# Patient Record
Sex: FEMALE | Race: WHITE | NOT HISPANIC OR LATINO | Employment: UNEMPLOYED | URBAN - METROPOLITAN AREA
[De-identification: names, ages, dates, MRNs, and addresses within clinical notes are randomized per-mention and may not be internally consistent; named-entity substitution may affect disease eponyms.]

---

## 2018-08-13 ENCOUNTER — OFFICE VISIT (OUTPATIENT)
Dept: ORTHOPEDICS | Facility: CLINIC | Age: 23
End: 2018-08-13
Payer: MEDICAID

## 2018-08-13 VITALS — HEIGHT: 61 IN | WEIGHT: 129 LBS | BODY MASS INDEX: 24.35 KG/M2

## 2018-08-13 DIAGNOSIS — M65.4 DE QUERVAIN'S DISEASE (RADIAL STYLOID TENOSYNOVITIS): Primary | ICD-10-CM

## 2018-08-13 PROCEDURE — 99213 OFFICE O/P EST LOW 20 MIN: CPT | Mod: 25,,, | Performed by: ORTHOPAEDIC SURGERY

## 2018-08-13 PROCEDURE — 20550 NJX 1 TENDON SHEATH/LIGAMENT: CPT | Mod: RT,,, | Performed by: ORTHOPAEDIC SURGERY

## 2018-08-13 PROCEDURE — 73100 X-RAY EXAM OF WRIST: CPT | Mod: FY,RT,, | Performed by: ORTHOPAEDIC SURGERY

## 2018-08-13 RX ORDER — MIRTAZAPINE 30 MG/1
1 TABLET, FILM COATED ORAL DAILY
COMMUNITY
Start: 2018-08-03 | End: 2018-09-10 | Stop reason: DRUGHIGH

## 2018-08-13 RX ORDER — VENLAFAXINE HYDROCHLORIDE 225 MG/1
1 TABLET, EXTENDED RELEASE ORAL DAILY
Status: ON HOLD | COMMUNITY
Start: 2018-08-03 | End: 2019-08-06 | Stop reason: HOSPADM

## 2018-08-13 RX ORDER — VENLAFAXINE HYDROCHLORIDE 150 MG/1
1 CAPSULE, EXTENDED RELEASE ORAL NIGHTLY
COMMUNITY
Start: 2018-08-03 | End: 2019-07-31 | Stop reason: SDUPTHER

## 2018-08-13 RX ORDER — TESTOSTERONE CYPIONATE 1000 MG/10ML
100 INJECTION, SOLUTION INTRAMUSCULAR WEEKLY
COMMUNITY
Start: 2018-08-03

## 2018-08-13 RX ORDER — BUSPIRONE HYDROCHLORIDE 15 MG/1
1 TABLET ORAL DAILY
COMMUNITY
End: 2018-09-10 | Stop reason: SDUPTHER

## 2018-08-13 RX ADMIN — TRIAMCINOLONE ACETONIDE 40 MG: 40 INJECTION, SUSPENSION INTRA-ARTICULAR; INTRAMUSCULAR at 04:08

## 2018-08-13 NOTE — PROCEDURES
Intermediate Joint Aspiration/Injection  Date/Time: 8/13/2018 2:00 PM  Performed by: Pedro Smith MD  Authorized by: Pedro Smith MD     Indications:  Pain    Location:  Wrist  Medications:  20 mg triamcinolone hexacetonide 20 mg/mL

## 2018-08-13 NOTE — PROGRESS NOTES
Past Medical History:   Diagnosis Date    Anxiety 2015    Asthma     Bulging disc     Panic disorder 2016    Protrusion of lumbar intervertebral disc        History reviewed. No pertinent surgical history.    Current Outpatient Medications   Medication Sig    busPIRone (BUSPAR) 15 MG tablet Take 15 mg by mouth once daily.    busPIRone (BUSPAR) 15 MG tablet Take 1 tablet by mouth once daily.    methocarbamol (ROBAXIN) 750 MG Tab Take 750 mg by mouth as needed.    mirtazapine (REMERON) 30 MG tablet Take 1 tablet by mouth once daily.    naproxen (NAPROSYN) 500 MG tablet Take 500 mg by mouth as needed.    testosterone cypionate (DEPOTESTOTERONE CYPIONATE) 100 mg/mL injection     venlafaxine (EFFEXOR-XR) 150 MG Cp24 Take 1 capsule by mouth every evening.    venlafaxine 225 mg TR24 Take 1 tablet by mouth once daily.     No current facility-administered medications for this visit.        Review of patient's allergies indicates:  No Known Allergies    Family History   Problem Relation Age of Onset    Heart disease Paternal Grandmother        Social History     Socioeconomic History    Marital status: Single     Spouse name: Not on file    Number of children: Not on file    Years of education: Not on file    Highest education level: Not on file   Social Needs    Financial resource strain: Not on file    Food insecurity - worry: Not on file    Food insecurity - inability: Not on file    Transportation needs - medical: Not on file    Transportation needs - non-medical: Not on file   Occupational History    Not on file   Tobacco Use    Smoking status: Never Smoker    Smokeless tobacco: Never Used   Substance and Sexual Activity    Alcohol use: No     Frequency: Never    Drug use: No    Sexual activity: Not on file   Other Topics Concern    Not on file   Social History Narrative    Not on file       Chief Complaint:   Chief Complaint   Patient presents with    Right Wrist - Pain     Patient has  "had Right Wrist pain for about 4-5 months with no apparent injury. No xrays. No treatment. Texting with right hand hurts as well as holding anything with weight.        Consulting Physician: No ref. provider found    History of Present Illness:    This is a 22 y.o. year old female who complains of patient presents with about a four-month history of right wrist pain denies any history of any injury      ROS    Examination:    Vital Signs:    Vitals:    08/13/18 1334   Weight: 58.5 kg (129 lb)   Height: 5' 1" (1.549 m)   PainSc:   5   PainLoc: Wrist       This a well-developed, well nourished patient in no acute distress.    Alert and oriented and cooperative to examination.       Physical Exam: right wrist-there is swelling over the first dorsal compartment of the wrist the patient has a positive Finkelstein test patient is good range of motion of the wrist and fingers    Imaging:  AP and lateral x-ray of the right wrist was negative     Assessment: de Quervain's tenosynovitis right wrist    Plan:  Will inject the area with Kenalogthe procedure for the injection of the de Quervain's tenosynovitis of the right wrist was performed on 08/13/2018      DISCLAIMER: This note may have been dictated using voice recognition software and may contain grammatical errors.     NOTE: Consult report sent to referring provider via [facility] EMR.  "

## 2018-08-22 RX ORDER — TRIAMCINOLONE ACETONIDE 40 MG/ML
40 INJECTION, SUSPENSION INTRA-ARTICULAR; INTRAMUSCULAR
Status: SHIPPED | OUTPATIENT
Start: 2018-08-13 | End: 2018-08-13

## 2018-08-22 NOTE — PROCEDURES
R first doral compartmentTendon Sheath  Date/Time: 8/13/2018 4:26 PM  Performed by: Pedro Smith MD  Authorized by: Pedro Smith MD     Indications: Pain  Location:  Wrist  Needle size:  22 G  Medications:  40 mg triamcinolone acetonide 40 mg/mL

## 2018-09-10 ENCOUNTER — OFFICE VISIT (OUTPATIENT)
Dept: ORTHOPEDICS | Facility: CLINIC | Age: 23
End: 2018-09-10
Payer: MEDICAID

## 2018-09-10 VITALS — BODY MASS INDEX: 24.35 KG/M2 | HEIGHT: 61 IN | WEIGHT: 129 LBS

## 2018-09-10 DIAGNOSIS — M65.4 DE QUERVAIN'S DISEASE (RADIAL STYLOID TENOSYNOVITIS): Primary | ICD-10-CM

## 2018-09-10 PROCEDURE — 99213 OFFICE O/P EST LOW 20 MIN: CPT | Mod: ,,, | Performed by: ORTHOPAEDIC SURGERY

## 2018-09-10 RX ORDER — MIRTAZAPINE 45 MG/1
1 TABLET, FILM COATED ORAL DAILY
Status: ON HOLD | COMMUNITY
Start: 2018-08-13 | End: 2019-08-06 | Stop reason: HOSPADM

## 2018-09-10 NOTE — PROGRESS NOTES
Past Medical History:   Diagnosis Date    Anxiety 2015    Asthma     Bulging disc     Panic disorder 2016    Protrusion of lumbar intervertebral disc        History reviewed. No pertinent surgical history.    Current Outpatient Medications   Medication Sig    mirtazapine (REMERON) 45 MG tablet Take 1 tablet by mouth once daily.    naproxen (NAPROSYN) 500 MG tablet Take 500 mg by mouth as needed.    testosterone cypionate (DEPOTESTOTERONE CYPIONATE) 100 mg/mL injection Inject 100 mg into the muscle once a week.     venlafaxine (EFFEXOR-XR) 150 MG Cp24 Take 1 capsule by mouth every evening.    venlafaxine 225 mg TR24 Take 1 tablet by mouth once daily.     No current facility-administered medications for this visit.      Facility-Administered Medications Ordered in Other Visits   Medication    [] triamcinolone acetonide injection 40 mg       Review of patient's allergies indicates:  No Known Allergies    Family History   Problem Relation Age of Onset    Heart disease Paternal Grandmother        Social History     Socioeconomic History    Marital status: Single     Spouse name: Not on file    Number of children: Not on file    Years of education: Not on file    Highest education level: Not on file   Social Needs    Financial resource strain: Not on file    Food insecurity - worry: Not on file    Food insecurity - inability: Not on file    Transportation needs - medical: Not on file    Transportation needs - non-medical: Not on file   Occupational History    Not on file   Tobacco Use    Smoking status: Never Smoker    Smokeless tobacco: Never Used   Substance and Sexual Activity    Alcohol use: No     Frequency: Never    Drug use: No    Sexual activity: Not on file   Other Topics Concern    Not on file   Social History Narrative    Not on file       Chief Complaint:   Chief Complaint   Patient presents with    Follow-up     Chronic right wrist pain since approx. 3/2018 w/ no known  "injury.  Patient received a steroid injection on 8/13/18 visit which helped.  Problems off & on with discomfort.  Reports a popping in the wrist when moving it at times.       Consulting Physician: No ref. provider found    History of Present Illness:    This is a 22 y.o. year old female who complains of patient is following up today for history of chronic wrist pain patient was injected last time for de Quervain's T no synovitis of the right thumb patient had a good response with the injection the patient complains of some intermittent popping pain in the wrist but overall is doing much better      ROS    Examination:    Vital Signs:    Vitals:    09/10/18 0826   Weight: 58.5 kg (129 lb)   Height: 5' 1" (1.549 m)   PainSc:   2   PainLoc: Wrist       This a well-developed, well nourished patient in no acute distress.    Alert and oriented and cooperative to examination.       Physical Exam: right wrist-patient has a negative Finkelstein test today with only some mild discomfort over the radial styloid area    Imaging:  No x-rays done today     Assessment: jean Hatfield's see no synovitis of the right wrist doing well with the injection    Plan:  At this time the patient is notready to do any surgery on the wrist at this time will observe if thepain increases I would recommend jean Hatfield's release      DISCLAIMER: This note may have been dictated using voice recognition software and may contain grammatical errors.     NOTE: Consult report sent to referring provider via EP[facility] EMR.  "

## 2019-04-03 ENCOUNTER — OFFICE VISIT (OUTPATIENT)
Dept: ORTHOPEDICS | Facility: CLINIC | Age: 24
End: 2019-04-03
Payer: MEDICAID

## 2019-04-03 VITALS
BODY MASS INDEX: 24.35 KG/M2 | SYSTOLIC BLOOD PRESSURE: 110 MMHG | HEIGHT: 61 IN | DIASTOLIC BLOOD PRESSURE: 84 MMHG | WEIGHT: 129 LBS

## 2019-04-03 DIAGNOSIS — M65.4 DE QUERVAIN'S DISEASE (RADIAL STYLOID TENOSYNOVITIS): Primary | ICD-10-CM

## 2019-04-03 PROCEDURE — 73100 X-RAY EXAM OF WRIST: CPT | Mod: FY,RT,, | Performed by: ORTHOPAEDIC SURGERY

## 2019-04-03 PROCEDURE — 73100 PR  X-RAY WRIST 2 VW: ICD-10-PCS | Mod: FY,RT,, | Performed by: ORTHOPAEDIC SURGERY

## 2019-04-03 PROCEDURE — 99213 PR OFFICE/OUTPT VISIT, EST, LEVL III, 20-29 MIN: ICD-10-PCS | Mod: ,,, | Performed by: ORTHOPAEDIC SURGERY

## 2019-04-03 PROCEDURE — 99213 OFFICE O/P EST LOW 20 MIN: CPT | Mod: ,,, | Performed by: ORTHOPAEDIC SURGERY

## 2019-04-03 RX ORDER — HYDROXYZINE HYDROCHLORIDE 25 MG/1
TABLET, FILM COATED ORAL
Status: ON HOLD | COMMUNITY
Start: 2019-03-09 | End: 2019-08-06 | Stop reason: HOSPADM
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
## 2019-04-05 LAB
ALBUMIN SERPL-MCNC: 4.7 G/DL (ref 3.1–4.7)
ALP SERPL-CCNC: 61 IU/L (ref 40–104)
ALT (SGPT): 20 IU/L (ref 3–33)
AST SERPL-CCNC: 20 IU/L (ref 10–40)
BILIRUB SERPL-MCNC: 0.9 MG/DL (ref 0.3–1)
BUN SERPL-MCNC: 14 MG/DL (ref 8–20)
CALCIUM SERPL-MCNC: 9.8 MG/DL (ref 7.7–10.4)
CHLORIDE: 101 MMOL/L (ref 98–110)
CO2 SERPL-SCNC: 29 MMOL/L (ref 22.8–31.6)
CREATININE: 0.69 MG/DL (ref 0.6–1.4)
GLUCOSE: 77 MG/DL (ref 70–99)
HCT VFR BLD AUTO: 40.5 % (ref 36–48)
HGB BLD-MCNC: 13.2 G/DL (ref 12–15)
MCH RBC QN AUTO: 29.9 PG (ref 25–35)
MCHC RBC AUTO-ENTMCNC: 32.6 G/DL (ref 31–36)
MCV RBC AUTO: 91.6 FL (ref 79–98)
NUCLEATED RBCS: 0 %
PLATELET # BLD AUTO: 295 K/UL (ref 140–440)
POTASSIUM SERPL-SCNC: 4 MMOL/L (ref 3.5–5)
PROT SERPL-MCNC: 8.1 G/DL (ref 6–8.2)
RBC # BLD AUTO: 4.42 M/UL (ref 3.5–5.5)
SODIUM: 139 MMOL/L (ref 134–144)
WBC # BLD AUTO: 4.9 K/UL (ref 5–10)

## 2019-06-27 DIAGNOSIS — M65.4 DE QUERVAIN'S DISEASE (RADIAL STYLOID TENOSYNOVITIS): Primary | ICD-10-CM

## 2019-07-05 LAB
ALBUMIN SERPL-MCNC: 4.7 G/DL (ref 3.1–4.7)
ALP SERPL-CCNC: 55 IU/L (ref 40–104)
ALT (SGPT): 17 IU/L (ref 3–33)
AST SERPL-CCNC: 22 IU/L (ref 10–40)
BILIRUB SERPL-MCNC: 1.2 MG/DL (ref 0.3–1)
BUN SERPL-MCNC: 12 MG/DL (ref 8–20)
CALCIUM SERPL-MCNC: 9.4 MG/DL (ref 7.7–10.4)
CHLORIDE: 102 MMOL/L (ref 98–110)
CO2 SERPL-SCNC: 27.7 MMOL/L (ref 22.8–31.6)
CREATININE: 0.78 MG/DL (ref 0.6–1.4)
GLUCOSE: 83 MG/DL (ref 70–99)
HCT VFR BLD AUTO: 42.1 % (ref 36–48)
HGB BLD-MCNC: 14.1 G/DL (ref 12–15)
MCH RBC QN AUTO: 30.1 PG (ref 25–35)
MCHC RBC AUTO-ENTMCNC: 33.5 G/DL (ref 31–36)
MCV RBC AUTO: 90 FL (ref 79–98)
NUCLEATED RBCS: 0 %
PLATELET # BLD AUTO: 285 K/UL (ref 140–440)
POTASSIUM SERPL-SCNC: 4.1 MMOL/L (ref 3.5–5)
PROT SERPL-MCNC: 8 G/DL (ref 6–8.2)
RBC # BLD AUTO: 4.68 M/UL (ref 3.5–5.5)
SODIUM: 138 MMOL/L (ref 134–144)
WBC # BLD AUTO: 4.6 K/UL (ref 5–10)

## 2019-07-17 ENCOUNTER — OFFICE VISIT (OUTPATIENT)
Dept: ORTHOPEDICS | Facility: CLINIC | Age: 24
End: 2019-07-17
Payer: MEDICAID

## 2019-07-17 VITALS — TEMPERATURE: 98 F | HEIGHT: 61 IN | HEART RATE: 113 BPM | WEIGHT: 129 LBS | BODY MASS INDEX: 24.35 KG/M2

## 2019-07-17 DIAGNOSIS — M65.4 DE QUERVAIN'S DISEASE (RADIAL STYLOID TENOSYNOVITIS): Primary | ICD-10-CM

## 2019-07-17 PROCEDURE — 99024 PR POST-OP FOLLOW-UP VISIT: ICD-10-PCS | Mod: ,,, | Performed by: ORTHOPAEDIC SURGERY

## 2019-07-17 PROCEDURE — 99024 POSTOP FOLLOW-UP VISIT: CPT | Mod: ,,, | Performed by: ORTHOPAEDIC SURGERY

## 2019-07-17 RX ORDER — SULFAMETHOXAZOLE AND TRIMETHOPRIM 800; 160 MG/1; MG/1
TABLET ORAL
Refills: 0 | COMMUNITY
Start: 2019-07-11 | End: 2019-07-31 | Stop reason: ALTCHOICE

## 2019-07-17 RX ORDER — OXYCODONE AND ACETAMINOPHEN 10; 325 MG/1; MG/1
TABLET ORAL
Refills: 0 | Status: ON HOLD | COMMUNITY
Start: 2019-07-11 | End: 2019-08-06 | Stop reason: HOSPADM

## 2019-07-17 NOTE — PROGRESS NOTES
Past Medical History:   Diagnosis Date    Anxiety 2015    Asthma     Bulging disc     Panic disorder 2016    Protrusion of lumbar intervertebral disc        Past Surgical History:   Procedure Laterality Date    DE QUERVAIN'S RELEASE Right 2019       Current Outpatient Medications   Medication Sig    mirtazapine (REMERON) 45 MG tablet Take 1 tablet by mouth once daily.    oxyCODONE-acetaminophen (PERCOCET)  mg per tablet TK 1 T PO Q 4 TO 6 H PRN    sulfamethoxazole-trimethoprim 800-160mg (BACTRIM DS) 800-160 mg Tab TK 1 T PO  BID    testosterone cypionate (DEPOTESTOTERONE CYPIONATE) 100 mg/mL injection Inject 100 mg into the muscle once a week.     venlafaxine (EFFEXOR-XR) 150 MG Cp24 Take 1 capsule by mouth every evening.    venlafaxine 225 mg TR24 Take 1 tablet by mouth once daily.    hydrOXYzine HCl (ATARAX) 25 MG tablet      No current facility-administered medications for this visit.      Facility-Administered Medications Ordered in Other Visits   Medication    [] triamcinolone acetonide injection 40 mg       Review of patient's allergies indicates:  No Known Allergies    Family History   Problem Relation Age of Onset    Heart disease Paternal Grandmother        Social History     Socioeconomic History    Marital status: Single     Spouse name: Not on file    Number of children: Not on file    Years of education: Not on file    Highest education level: Not on file   Occupational History    Not on file   Social Needs    Financial resource strain: Not on file    Food insecurity:     Worry: Not on file     Inability: Not on file    Transportation needs:     Medical: Not on file     Non-medical: Not on file   Tobacco Use    Smoking status: Never Smoker    Smokeless tobacco: Never Used   Substance and Sexual Activity    Alcohol use: No     Frequency: Never    Drug use: No    Sexual activity: Not on file   Lifestyle    Physical activity:     Days per week: Not on file     " Minutes per session: Not on file    Stress: Not on file   Relationships    Social connections:     Talks on phone: Not on file     Gets together: Not on file     Attends Shinto service: Not on file     Active member of club or organization: Not on file     Attends meetings of clubs or organizations: Not on file     Relationship status: Not on file   Other Topics Concern    Not on file   Social History Narrative    Not on file       Chief Complaint:   Chief Complaint   Patient presents with    Right Wrist - Post-op Evaluation, Pain     DOS: 07/11/2019 6 Days S/P De'Quervains Release. Patient is in Splint and has PL 3/10. He states he has a little sore throat, sweats, and tired. He thinks pain meds are to strong, I advised to cut pain meds in 1/2 & take 1 tylenol with it.        Consulting Physician: No ref. provider found    History of Present Illness:    This is a 23 y.o. year old female who complains of patient is 6 days status post de Quervain's release of the right wrist      ROS    Examination:    Vital Signs:    Vitals:    07/17/19 1333   Pulse: (!) 113   Temp: 98.4 °F (36.9 °C)   TempSrc: Oral   Weight: 58.5 kg (129 lb)   Height: 5' 1" (1.549 m)   PainSc:   3       This a well-developed, well nourished patient in no acute distress.    Alert and oriented and cooperative to examination.       Physical Exam: right wrist-    Imaging:  No x-rays     Assessment: status post de Quervain's release right wrist    Plan:  We'll remove her splint and have her start range of motion exercises patient should leave the Steri-Strips in place for at least a week and will see patient back in 2      DISCLAIMER: This note may have been dictated using voice recognition software and may contain grammatical errors.     NOTE: Consult report sent to referring provider via EP[facility] EMR.  "

## 2019-07-19 ENCOUNTER — TELEPHONE (OUTPATIENT)
Dept: ORTHOPEDICS | Facility: CLINIC | Age: 24
End: 2019-07-19

## 2019-07-19 NOTE — TELEPHONE ENCOUNTER
----- Message from Pat Jackson MA sent at 7/18/2019 10:15 AM CDT -----  Contact: Fazal Kamara   Needs to schedule Post op,   Last week of July  Call back

## 2019-07-24 ENCOUNTER — TELEPHONE (OUTPATIENT)
Dept: ORTHOPEDICS | Facility: CLINIC | Age: 24
End: 2019-07-24

## 2019-07-24 NOTE — TELEPHONE ENCOUNTER
----- Message from Steve Chandler sent at 7/24/2019  8:47 AM CDT -----  Contact: pt father Fazal  Type: Needs Medical Advice    Who Called:  Fazal    [facility] Call Back Number: [phone]  Additional Information: Fazal has questions about care after surgery and if he needs to wait till the next appt

## 2019-07-24 NOTE — TELEPHONE ENCOUNTER
Patient's dad came into the office asking if bandage could be removed.  According to last visit, Dr. Smith told patient that Steri Strips could be removed with in the next week from appointment.  Informed father of this information and reminded him of return appointment next Wednesday 7/31/19 with Dr. Smith at new location.

## 2019-07-31 ENCOUNTER — OFFICE VISIT (OUTPATIENT)
Dept: ORTHOPEDICS | Facility: CLINIC | Age: 24
End: 2019-07-31
Payer: MEDICAID

## 2019-07-31 ENCOUNTER — HOSPITAL ENCOUNTER (EMERGENCY)
Facility: HOSPITAL | Age: 24
Discharge: PSYCHIATRIC OR SUBSTANCE USE FACILITY | End: 2019-08-01
Attending: EMERGENCY MEDICINE
Payer: MEDICAID

## 2019-07-31 VITALS
HEART RATE: 101 BPM | HEIGHT: 61 IN | BODY MASS INDEX: 24.35 KG/M2 | SYSTOLIC BLOOD PRESSURE: 113 MMHG | WEIGHT: 129 LBS | DIASTOLIC BLOOD PRESSURE: 72 MMHG

## 2019-07-31 DIAGNOSIS — F33.2 MDD (MAJOR DEPRESSIVE DISORDER), RECURRENT SEVERE, WITHOUT PSYCHOSIS: ICD-10-CM

## 2019-07-31 DIAGNOSIS — R45.851 SUICIDE IDEATION: Primary | ICD-10-CM

## 2019-07-31 DIAGNOSIS — F12.10 CANNABIS ABUSE: ICD-10-CM

## 2019-07-31 DIAGNOSIS — F33.2 SEVERE EPISODE OF RECURRENT MAJOR DEPRESSIVE DISORDER, WITHOUT PSYCHOTIC FEATURES: ICD-10-CM

## 2019-07-31 DIAGNOSIS — F41.1 GAD (GENERALIZED ANXIETY DISORDER): ICD-10-CM

## 2019-07-31 DIAGNOSIS — M65.4 DE QUERVAIN'S DISEASE (RADIAL STYLOID TENOSYNOVITIS): Primary | ICD-10-CM

## 2019-07-31 LAB
B-HCG UR QL: NEGATIVE
BASOPHILS # BLD AUTO: 0.02 K/UL (ref 0–0.2)
BASOPHILS NFR BLD: 0.2 % (ref 0–1.9)
CTP QC/QA: YES
DIFFERENTIAL METHOD: NORMAL
EOSINOPHIL # BLD AUTO: 0.1 K/UL (ref 0–0.5)
EOSINOPHIL NFR BLD: 0.6 % (ref 0–8)
ERYTHROCYTE [DISTWIDTH] IN BLOOD BY AUTOMATED COUNT: 13.2 % (ref 11.5–14.5)
HCT VFR BLD AUTO: 41.6 % (ref 37–48.5)
HGB BLD-MCNC: 14.1 G/DL (ref 12–16)
IMM GRANULOCYTES # BLD AUTO: 0.02 K/UL (ref 0–0.04)
IMM GRANULOCYTES NFR BLD AUTO: 0.2 % (ref 0–0.5)
LYMPHOCYTES # BLD AUTO: 3.4 K/UL (ref 1–4.8)
LYMPHOCYTES NFR BLD: 35.9 % (ref 18–48)
MCH RBC QN AUTO: 30.2 PG (ref 27–31)
MCHC RBC AUTO-ENTMCNC: 33.9 G/DL (ref 32–36)
MCV RBC AUTO: 89 FL (ref 82–98)
MONOCYTES # BLD AUTO: 0.7 K/UL (ref 0.3–1)
MONOCYTES NFR BLD: 7.7 % (ref 4–15)
NEUTROPHILS # BLD AUTO: 5.2 K/UL (ref 1.8–7.7)
NEUTROPHILS NFR BLD: 55.4 % (ref 38–73)
NRBC BLD-RTO: 0 /100 WBC
PLATELET # BLD AUTO: 319 K/UL (ref 150–350)
PMV BLD AUTO: 9.3 FL (ref 9.2–12.9)
RBC # BLD AUTO: 4.67 M/UL (ref 4–5.4)
WBC # BLD AUTO: 9.38 K/UL (ref 3.9–12.7)

## 2019-07-31 PROCEDURE — 99284 EMERGENCY DEPT VISIT MOD MDM: CPT

## 2019-07-31 PROCEDURE — 80307 DRUG TEST PRSMV CHEM ANLYZR: CPT

## 2019-07-31 PROCEDURE — 99282 EMERGENCY DEPT VISIT SF MDM: CPT | Mod: GT,AF,HB,S$PBB | Performed by: PSYCHIATRY & NEUROLOGY

## 2019-07-31 PROCEDURE — 99024 POSTOP FOLLOW-UP VISIT: CPT | Mod: ,,, | Performed by: ORTHOPAEDIC SURGERY

## 2019-07-31 PROCEDURE — 99999 PR PBB SHADOW E&M-NEW PATIENT-LVL III: CPT | Mod: PBBFAC,,, | Performed by: ORTHOPAEDIC SURGERY

## 2019-07-31 PROCEDURE — 99285 EMERGENCY DEPT VISIT HI MDM: CPT

## 2019-07-31 PROCEDURE — 85025 COMPLETE CBC W/AUTO DIFF WBC: CPT

## 2019-07-31 PROCEDURE — 99999 PR PBB SHADOW E&M-NEW PATIENT-LVL III: ICD-10-PCS | Mod: PBBFAC,,, | Performed by: ORTHOPAEDIC SURGERY

## 2019-07-31 PROCEDURE — 99282 PR EMERGENCY DEPT VISIT,LEVEL II: ICD-10-PCS | Mod: GT,AF,HB,S$PBB | Performed by: PSYCHIATRY & NEUROLOGY

## 2019-07-31 PROCEDURE — 80053 COMPREHEN METABOLIC PANEL: CPT

## 2019-07-31 PROCEDURE — 81025 URINE PREGNANCY TEST: CPT | Performed by: EMERGENCY MEDICINE

## 2019-07-31 PROCEDURE — 99024 PR POST-OP FOLLOW-UP VISIT: ICD-10-PCS | Mod: ,,, | Performed by: ORTHOPAEDIC SURGERY

## 2019-07-31 PROCEDURE — 99203 OFFICE O/P NEW LOW 30 MIN: CPT | Mod: PBBFAC,PN | Performed by: ORTHOPAEDIC SURGERY

## 2019-07-31 PROCEDURE — 80320 DRUG SCREEN QUANTALCOHOLS: CPT

## 2019-07-31 NOTE — PROGRESS NOTES
Past Medical History:   Diagnosis Date    Anxiety 2015    Asthma     Bulging disc     Panic disorder 2016    Protrusion of lumbar intervertebral disc        Past Surgical History:   Procedure Laterality Date    DE QUERVAIN'S RELEASE Right 2019       Current Outpatient Medications   Medication Sig    hydrOXYzine HCl (ATARAX) 25 MG tablet     mirtazapine (REMERON) 45 MG tablet Take 1 tablet by mouth once daily.    oxyCODONE-acetaminophen (PERCOCET)  mg per tablet TK 1 T PO Q 4 TO 6 H PRN    sulfamethoxazole-trimethoprim 800-160mg (BACTRIM DS) 800-160 mg Tab TK 1 T PO  BID    testosterone cypionate (DEPOTESTOTERONE CYPIONATE) 100 mg/mL injection Inject 100 mg into the muscle once a week.     venlafaxine (EFFEXOR-XR) 150 MG Cp24 Take 1 capsule by mouth every evening.    venlafaxine 225 mg TR24 Take 1 tablet by mouth once daily.     No current facility-administered medications for this visit.      Facility-Administered Medications Ordered in Other Visits   Medication    [] triamcinolone acetonide injection 40 mg       Review of patient's allergies indicates:  No Known Allergies    Family History   Problem Relation Age of Onset    Heart disease Paternal Grandmother        Social History     Socioeconomic History    Marital status: Single     Spouse name: Not on file    Number of children: Not on file    Years of education: Not on file    Highest education level: Not on file   Occupational History    Not on file   Social Needs    Financial resource strain: Not on file    Food insecurity:     Worry: Not on file     Inability: Not on file    Transportation needs:     Medical: Not on file     Non-medical: Not on file   Tobacco Use    Smoking status: Never Smoker    Smokeless tobacco: Never Used   Substance and Sexual Activity    Alcohol use: No     Frequency: Never    Drug use: No    Sexual activity: Not on file   Lifestyle    Physical activity:     Days per week: Not on file     " Minutes per session: Not on file    Stress: Not on file   Relationships    Social connections:     Talks on phone: Not on file     Gets together: Not on file     Attends Uatsdin service: Not on file     Active member of club or organization: Not on file     Attends meetings of clubs or organizations: Not on file     Relationship status: Not on file   Other Topics Concern    Not on file   Social History Narrative    Not on file       Chief Complaint:   Chief Complaint   Patient presents with    Right Wrist - Post-op Evaluation     DOS: 07/11/2019  20 days S/P De'Quervains Release. Patient states has no pain at this time. However there is pain with certain movements.        Consulting Physician: No ref. provider found    History of Present Illness:    This is a 23 y.o. year old female who complains of patient is now 20 days status post coronary is released to the right wrist patient denies any pain at this time may have some discomfort with certain movements      ROS    Examination:    Vital Signs:    Vitals:    07/31/19 1013   BP: 113/72   Pulse: 101   Weight: 58.5 kg (129 lb)   Height: 5' 1" (1.549 m)   PainSc: 0-No pain   PainLoc: Wrist       This a well-developed, well nourished patient in no acute distress.    Alert and oriented and cooperative to examination.       Physical Exam:  Right wrist-incision is healing well moves the thumb well as a negative Finkelstein test and good range of motion    Imaging:  No x-rays     Assessment:  Status post right de Quervain release doing well    Plan:  Patient can increase activities as tolerated and return as needed      DISCLAIMER: This note may have been dictated using voice recognition software and may contain grammatical errors.     NOTE: Consult report sent to referring provider via EPIC EMR.  "

## 2019-08-01 VITALS
HEIGHT: 62 IN | RESPIRATION RATE: 19 BRPM | BODY MASS INDEX: 21.16 KG/M2 | SYSTOLIC BLOOD PRESSURE: 118 MMHG | HEART RATE: 113 BPM | DIASTOLIC BLOOD PRESSURE: 68 MMHG | TEMPERATURE: 99 F | WEIGHT: 115 LBS | OXYGEN SATURATION: 98 %

## 2019-08-01 PROBLEM — R45.851 SUICIDAL IDEATION: Status: ACTIVE | Noted: 2019-08-01

## 2019-08-01 LAB
ALBUMIN SERPL BCP-MCNC: 5.1 G/DL (ref 3.5–5.2)
ALP SERPL-CCNC: 56 U/L (ref 55–135)
ALT SERPL W/O P-5'-P-CCNC: 19 U/L (ref 10–44)
AMPHET+METHAMPHET UR QL: NEGATIVE
ANION GAP SERPL CALC-SCNC: 8 MMOL/L (ref 8–16)
APAP SERPL-MCNC: <10 UG/ML (ref 10–20)
AST SERPL-CCNC: 20 U/L (ref 10–40)
BACTERIA #/AREA URNS HPF: NEGATIVE /HPF
BARBITURATES UR QL SCN>200 NG/ML: NORMAL
BENZODIAZ UR QL SCN>200 NG/ML: NEGATIVE
BILIRUB SERPL-MCNC: 1 MG/DL (ref 0.1–1)
BILIRUB UR QL STRIP: ABNORMAL
BUN SERPL-MCNC: 20 MG/DL (ref 6–20)
BZE UR QL SCN: NEGATIVE
CALCIUM SERPL-MCNC: 10 MG/DL (ref 8.7–10.5)
CANNABINOIDS UR QL SCN: NORMAL
CHLORIDE SERPL-SCNC: 103 MMOL/L (ref 95–110)
CLARITY UR: CLEAR
CO2 SERPL-SCNC: 28 MMOL/L (ref 23–29)
COLOR UR: YELLOW
CREAT SERPL-MCNC: 0.7 MG/DL (ref 0.5–1.4)
CREAT UR-MCNC: 206 MG/DL (ref 15–325)
EST. GFR  (AFRICAN AMERICAN): >60 ML/MIN/1.73 M^2
EST. GFR  (NON AFRICAN AMERICAN): >60 ML/MIN/1.73 M^2
ETHANOL SERPL-MCNC: <5 MG/DL
GLUCOSE SERPL-MCNC: 90 MG/DL (ref 70–110)
GLUCOSE UR QL STRIP: NEGATIVE
HGB UR QL STRIP: ABNORMAL
HYALINE CASTS #/AREA URNS LPF: 17 /LPF
KETONES UR QL STRIP: ABNORMAL
LEUKOCYTE ESTERASE UR QL STRIP: ABNORMAL
MICROSCOPIC COMMENT: ABNORMAL
NITRITE UR QL STRIP: NEGATIVE
OPIATES UR QL SCN: NEGATIVE
PCP UR QL SCN>25 NG/ML: NEGATIVE
PH UR STRIP: 6 [PH] (ref 5–8)
POTASSIUM SERPL-SCNC: 3.7 MMOL/L (ref 3.5–5.1)
PROT SERPL-MCNC: 8.1 G/DL (ref 6–8.4)
PROT UR QL STRIP: ABNORMAL
RBC #/AREA URNS HPF: 4 /HPF (ref 0–4)
SODIUM SERPL-SCNC: 139 MMOL/L (ref 136–145)
SP GR UR STRIP: >=1.03 (ref 1–1.03)
SQUAMOUS #/AREA URNS HPF: 2 /HPF
TOXICOLOGY INFORMATION: NORMAL
URN SPEC COLLECT METH UR: ABNORMAL
UROBILINOGEN UR STRIP-ACNC: NEGATIVE EU/DL
WBC #/AREA URNS HPF: >100 /HPF (ref 0–5)

## 2019-08-01 PROCEDURE — 81001 URINALYSIS AUTO W/SCOPE: CPT | Mod: 59

## 2019-08-01 PROCEDURE — 80307 DRUG TEST PRSMV CHEM ANLYZR: CPT
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
## 2019-08-01 NOTE — ED NOTES
Pt to psych facility with SPD. Security and sitter escorted out. Items returned. Pt ambulatory at d/c. Wheeled out by staff. Purple gown removed. Paper scrubs applied.

## 2019-08-01 NOTE — ED NOTES
ED provider notified of conversation with transfer center.  Transfer center paged per MD request.

## 2019-08-01 NOTE — ED PROVIDER NOTES
Encounter Date: 7/31/2019       History     Chief Complaint   Patient presents with    Suicidal     INTERMITTENT, WITH POSSIBLE THOUGHTS BUT NO ACTUAL PLAN     24-year-old female with history of social anxiety disorder generalized anxiety depression major depressive episode previous suicide attempt in the past patient referred to the emergency department after being interviewed by her psychologist and she reported desires of self-harm and suicidal ideation.  Patient states that she has been having suicidal thoughts over the last 2-3 days which has been increasing and fixated on these emotions.  She denies homicidal ideation no recent illness or constitutional symptoms. Patient does agree that she feels that she needs inpatient evaluation and treatment at this time.        Review of patient's allergies indicates:  No Known Allergies  Past Medical History:   Diagnosis Date    Anxiety 2015    Asthma     Bulging disc     Panic disorder 2016    Protrusion of lumbar intervertebral disc      Past Surgical History:   Procedure Laterality Date    DE QUERVAIN'S RELEASE Right 07/11/2019     Family History   Problem Relation Age of Onset    Heart disease Paternal Grandmother      Social History     Tobacco Use    Smoking status: Never Smoker    Smokeless tobacco: Never Used   Substance Use Topics    Alcohol use: No     Frequency: Never    Drug use: No     Review of Systems   Constitutional: Negative for chills, fatigue and fever.   HENT: Negative for congestion, postnasal drip, rhinorrhea, sinus pain and trouble swallowing.    Eyes: Negative for photophobia and visual disturbance.   Respiratory: Negative for chest tightness, shortness of breath and wheezing.    Cardiovascular: Negative for chest pain, palpitations and leg swelling.   Gastrointestinal: Negative for abdominal pain, blood in stool, nausea and vomiting.   Endocrine: Negative for polydipsia, polyphagia and polyuria.   Genitourinary: Negative for dysuria,  flank pain, urgency, vaginal bleeding and vaginal discharge.   Musculoskeletal: Negative for back pain and gait problem.   Skin: Negative for rash.   Neurological: Negative for tremors, weakness and numbness.   Hematological: Does not bruise/bleed easily.   Psychiatric/Behavioral: Positive for decreased concentration, self-injury and suicidal ideas. Negative for confusion. The patient is nervous/anxious.    All other systems reviewed and are negative.      Physical Exam     Initial Vitals [07/31/19 2152]   BP Pulse Resp Temp SpO2   113/75 (!) 120 20 98.4 °F (36.9 °C) 98 %      MAP       --         Physical Exam    Nursing note and vitals reviewed.  Constitutional: She appears well-developed and well-nourished.   HENT:   Head: Normocephalic and atraumatic.   Nose: Nose normal.   Mouth/Throat: Oropharynx is clear and moist.   Eyes: Conjunctivae and EOM are normal. Pupils are equal, round, and reactive to light.   Neck: Normal range of motion. Neck supple. No thyromegaly present. No tracheal deviation present.   Cardiovascular: Normal rate, regular rhythm, normal heart sounds and intact distal pulses. Exam reveals no gallop and no friction rub.    No murmur heard.  Pulmonary/Chest: Breath sounds normal. No respiratory distress.   Abdominal: Soft. Bowel sounds are normal. She exhibits no mass. There is no rebound and no guarding.   Musculoskeletal: Normal range of motion. She exhibits no edema.   Lymphadenopathy:     She has no cervical adenopathy.   Neurological: She is alert and oriented to person, place, and time. She has normal strength and normal reflexes. GCS score is 15. GCS eye subscore is 4. GCS verbal subscore is 5. GCS motor subscore is 6.   Skin: Skin is warm and dry. Capillary refill takes less than 2 seconds.   Psychiatric: Her speech is normal. Her mood appears anxious. She is slowed. She is not hyperactive, not actively hallucinating and not combative. Cognition and memory are normal. She exhibits a  depressed mood. She expresses suicidal ideation. She expresses suicidal plans.   Patient with previous history of overdose.         ED Course   Procedures  Labs Reviewed   COMPREHENSIVE METABOLIC PANEL   CBC W/ AUTO DIFFERENTIAL   DRUG SCREEN PANEL, URINE EMERGENCY    Narrative:     Preferred Collection Type->Urine, Clean Catch  Specimen Source->Urine   ALCOHOL,MEDICAL (ETHANOL)   ACETAMINOPHEN LEVEL   URINALYSIS, REFLEX TO URINE CULTURE   POCT URINE PREGNANCY          Imaging Results    None          Medical Decision Making:   Initial Assessment:    23-year-old female with history of depression, anxiety, major depressive disorder currently currently compliant with her medications patient however with increased thoughts of Suicidal ideation over the last 24-48 hours presents to the emergency department for inpatient psychiatric evaluation and medical clearance.  Differential Diagnosis:   Differential Diagnosis includes, but is not limited to:  Decompensated psychiatric disease (schizophrenia, bipolar disorder, major depression), excited delirium, medication noncompliance, substance abuse/withdrawal, intentional drug overdose, medication toxicity, APAP/ASA overdose, acute stress reaction, personality disorder, malingering, metabolic derangement    Clinical Tests:   Lab Tests: Ordered and Reviewed  ED Management:  7:59 AM The patient is medically cleared for psychiatric hospitalization at this time.  I have reviewed all testing done in the Emergency Department, no acute medical issue is present that would account for the patient's symptoms.                       ED Course as of Aug 01 0743   Thu Aug 01, 2019   0003 I discussed the case with Dr. Condon who agreed with inpatient psychiatric placement.    [AP]   0345 Patient is medically clear for psychiatric evaluation    [AP]   0346 BP: 113/75 [AP]   0347 Temp: 98.4 °F (36.9 °C) [AP]   0347 Pulse(!): 120 [AP]   0347 Resp: 20 [AP]   0347 SpO2: 98 % [AP]   0503 Pulse: 84  [AP]   0504 Pulse: 84 [AP]      ED Course User Index  [AP] Peter Peterson MD     Clinical Impression:       ICD-10-CM ICD-9-CM   1. Suicide ideation R45.851 V62.84   2. Severe episode of recurrent major depressive disorder, without psychotic features F33.2 296.33         Disposition:   Disposition: Transferred  Condition: Stable                        Robert Bowden MD  08/01/19 0802

## 2019-08-02 PROBLEM — F63.9 IMPULSE CONTROL DISORDER: Status: ACTIVE | Noted: 2019-08-02

## 2019-08-02 PROBLEM — J45.20 MILD INTERMITTENT ASTHMA WITHOUT COMPLICATION: Status: ACTIVE | Noted: 2019-08-02

## 2019-08-02 PROBLEM — Z13.9 ENCOUNTER FOR MEDICAL SCREENING EXAMINATION: Status: ACTIVE | Noted: 2019-08-02

## 2019-08-02 PROBLEM — N30.00 ACUTE CYSTITIS WITHOUT HEMATURIA: Status: ACTIVE | Noted: 2019-08-02

## 2019-08-02 PROBLEM — F33.2 MAJOR DEPRESSIVE DISORDER, RECURRENT SEVERE WITHOUT PSYCHOTIC FEATURES: Status: ACTIVE | Noted: 2019-08-01

## 2019-08-02 PROBLEM — F12.10 CANNABIS USE DISORDER, MILD, ABUSE: Status: ACTIVE | Noted: 2019-08-02

## 2019-11-04 PROBLEM — Z13.9 ENCOUNTER FOR MEDICAL SCREENING EXAMINATION: Status: RESOLVED | Noted: 2019-08-02 | Resolved: 2019-11-04

## 2019-11-22 ENCOUNTER — CLINICAL SUPPORT (OUTPATIENT)
Dept: URGENT CARE | Facility: CLINIC | Age: 24
End: 2019-11-22
Payer: MEDICAID

## 2019-11-22 VITALS
WEIGHT: 128 LBS | HEIGHT: 62 IN | BODY MASS INDEX: 23.55 KG/M2 | TEMPERATURE: 101 F | OXYGEN SATURATION: 98 % | HEART RATE: 116 BPM | DIASTOLIC BLOOD PRESSURE: 76 MMHG | RESPIRATION RATE: 18 BRPM | SYSTOLIC BLOOD PRESSURE: 111 MMHG

## 2019-11-22 DIAGNOSIS — R11.0 NAUSEA: ICD-10-CM

## 2019-11-22 DIAGNOSIS — R68.89 FLU-LIKE SYMPTOMS: Primary | ICD-10-CM

## 2019-11-22 DIAGNOSIS — J34.9 SINUS PROBLEM: ICD-10-CM

## 2019-11-22 DIAGNOSIS — R50.9 FEVER, UNSPECIFIED FEVER CAUSE: ICD-10-CM

## 2019-11-22 LAB
CTP QC/QA: YES
CTP QC/QA: YES
FLUAV AG NPH QL: NEGATIVE
FLUBV AG NPH QL: NEGATIVE
S PYO RRNA THROAT QL PROBE: NEGATIVE

## 2019-11-22 PROCEDURE — 87880 STREP A ASSAY W/OPTIC: CPT | Mod: QW,,, | Performed by: NURSE PRACTITIONER

## 2019-11-22 PROCEDURE — 87804 POCT INFLUENZA A/B: ICD-10-PCS | Mod: QW,,, | Performed by: NURSE PRACTITIONER

## 2019-11-22 PROCEDURE — 87880 POCT RAPID STREP A: ICD-10-PCS | Mod: QW,,, | Performed by: NURSE PRACTITIONER

## 2019-11-22 PROCEDURE — 99204 PR OFFICE/OUTPT VISIT, NEW, LEVL IV, 45-59 MIN: ICD-10-PCS | Mod: 25,S$GLB,, | Performed by: NURSE PRACTITIONER

## 2019-11-22 PROCEDURE — 99204 OFFICE O/P NEW MOD 45 MIN: CPT | Mod: 25,S$GLB,, | Performed by: NURSE PRACTITIONER

## 2019-11-22 PROCEDURE — 87804 INFLUENZA ASSAY W/OPTIC: CPT | Mod: QW,,, | Performed by: NURSE PRACTITIONER

## 2019-11-22 RX ORDER — PREDNISONE 20 MG/1
20 TABLET ORAL 2 TIMES DAILY
Qty: 10 TABLET | Refills: 0 | Status: SHIPPED | OUTPATIENT
Start: 2019-11-22 | End: 2019-11-27

## 2019-11-22 RX ORDER — DEXAMETHASONE SODIUM PHOSPHATE 4 MG/ML
8 INJECTION, SOLUTION INTRA-ARTICULAR; INTRALESIONAL; INTRAMUSCULAR; INTRAVENOUS; SOFT TISSUE
Status: COMPLETED | OUTPATIENT
Start: 2019-11-22 | End: 2019-11-22

## 2019-11-22 RX ORDER — OSELTAMIVIR PHOSPHATE 75 MG/1
75 CAPSULE ORAL 2 TIMES DAILY
Qty: 10 CAPSULE | Refills: 0 | Status: SHIPPED | OUTPATIENT
Start: 2019-11-22 | End: 2019-11-27

## 2019-11-22 RX ORDER — PROMETHAZINE HYDROCHLORIDE 25 MG/1
25 TABLET ORAL EVERY 4 HOURS PRN
Qty: 30 TABLET | Refills: 0 | Status: SHIPPED | OUTPATIENT
Start: 2019-11-22 | End: 2021-02-12 | Stop reason: CLARIF

## 2019-11-22 RX ORDER — ACETAMINOPHEN 500 MG
1000 TABLET ORAL
Status: COMPLETED | OUTPATIENT
Start: 2019-11-22 | End: 2019-11-22

## 2019-11-22 RX ADMIN — Medication 1000 MG: at 12:11

## 2019-11-22 RX ADMIN — DEXAMETHASONE SODIUM PHOSPHATE 8 MG: 4 INJECTION, SOLUTION INTRA-ARTICULAR; INTRALESIONAL; INTRAMUSCULAR; INTRAVENOUS; SOFT TISSUE at 12:11

## 2019-11-22 NOTE — PATIENT INSTRUCTIONS
Symptomatic treatment:    Alternate Tylenol and Ibuprofen every 3 hrs for fever, pain and inflammation. Avoid Nsaids if you are pregnant or have advanced kidney disease.     salt water gargles to soothe throat from post nasal drip  Honey/lemon water or warm tea to soothe throat from post nasal drip  Cepachol helps to soothe the discomfort in throat from post nasal drip    Cold-eeze helps to reduce the duration of URI symptoms if taken early  Elderberry to reduce duration of viral URI symptoms    Nasal saline spray reduces inflammation and dryness  Warm face compresses/hot showers as often as you can to open sinuses and allow to drain.   Flonase OTC or Nasacort OTC to help decrease inflammation in nasal turbinates and allow sinuses to drain    Vicks vapor rub at night  Simple foods like chicken noodle soup help provide hydration and nutrition    Delsym helps with coughing at night    Zantac will help if there is reflux from the post nasal drip and helpful to take at night    Zyrtec/Claritin during the day and Benadryl at night may help if allergy component concurrently with URI    Rest as much as you can    Your symptoms will likely last 5-7 days, maybe longer depending on how it affects your body.  You are contagious 5-7, so minimize contact with others to reduce the spread to others and stay home from work or school as we discussed. Dehydration is preventable but is one of the main reasons why you will feel so badly. Drink pedialyte, gatorade or propel. Stay hydrated.  Antibiotics are not needed unless a complication(such as Otitis Media, Bacterial sinus infection or pneumonia) develops. Taking antibiotics for Flu/Cold is not supported by evidence-based medicine and can expose you to unnecessary side effects of the medication, such as anaphylaxis, yeast infection and leads to antibiotic resistance.   If you experience any:  Chest pain, shortness of breath, wheezing or difficulty breathing,  Severe headache, face,  neck or ear pain,  New rash,  Fever over 101.5º F (38.6 C) for more than three days,  Confusion, behavior change or seizure,  Severe weakness or dizziness, please go to the ER immediately for further testing.           POST NASAL DRIP  Postnasal drip is a condition that causes a large amount of mucus to collect in your throat or nose. It may also be called upper airway cough syndrome because the mucus causes repeated coughing. You may have a sore throat, or throat tissues may swell. This may feel like a lump in your throat. You may also feel like you need to clear your throat often.    Manage postnasal drip:  Use a humidifier or vaporizer. Use a cool mist humidifier or a vaporizer to increase air moisture in your home. This may make it easier for you to breathe.  Drink more liquids as directed. Liquids help keep your air passages moist and help you cough up mucus. Ask how much liquid to drink each day and which liquids are best for you.  Sleep on propped up pillows, to keep the mucus from collecting at the back of your throat  Nasal irrigation (available over-the-counter)  Avoid cold air and dry, heated air. Cold or dry air can trigger postnasal drip. Try to stay inside on cold days, or keep your mouth covered. Do not stay long in areas that have dry, heated air.  Do not smoke, and avoid secondhand smoke. Nicotine and other chemicals in cigarettes and cigars can irritate your throat and make coughing worse. Ask your healthcare provider for information if you currently smoke and need help to quit. E-cigarettes or smokeless tobacco still contain nicotine. Talk to your healthcare provider before you use these products.    Medications  Medicines may be given to thin the mucus. You may need to swallow the medicine or use a device to flush your sinuses with liquid squirted into your nose. Nasal sprays may also be needed to keep the tissues in your nose moist. Medicines can also relieve congestion. Allergy medicine may  "help if your symptoms are caused by seasonal allergies, such as hay fever. You may need medicine to help control GERD.  Take your medicine as directed. Contact your healthcare provider if you think your medicine is not helping or if you have side effects. Tell him or her if you are allergic to any medicine. Keep a list of the medicines, vitamins, and herbs you take. Include the amounts, and when and why you take them. Bring the list or the pill bottles to follow-up visits. Carry your medicine list with you in case of an emergency.  A oral decongestant, such as pseudoephedrine (as in Sudafed) or phenylephrine (as in Sudafed PE or Aakash-Synephrine)  Guaifenesin (as in Mucinex), a medication that can thin the mucus  An anti-histamine, such as  diphenhydramine, as in Benadryl, chlorpheniramine, as in Chlor-Trimeton, loratadine, as in Claritin or Alavert, fexofenadine (Allegra), cetirizine (Zyrtec), levocetirizine (Xyzal), desloratadine (Clarinex)  A nasal decongestant such as oxymetazoline (contained in Afrin) which constricts blood vessels in the nasal passages; this leads to less secretions. Such medications should only be taken for a day or two; longer-term use can cause more harm than good)  Keep in mind that many of these medications are combined in over-the-counter products. For example, there are several formulations of "Sudafed" containing pseudoephedrine or phenylephrine along with additional drugs including acetaminophen, dextromethorphan, and guaifenesin. While these combinations can be effective, it's important to read the label and avoid taking too much of any active ingredient.  What about prescription treatments?  If these approaches aren't effective, prescription treatments may be the next best steps, including:  A nasal steroid spray (such as beclomethasone/Beconase or triamcinolone/Nasacort)  Ipratropium (Atrovent) nasal spray which inhibits secretions (such as mucus)  Other treatments depend on the " cause of the post-nasal drip. Antibiotics are not usually helpful so they aren't usually prescribed for post-nasal drip (unless the symptoms are due to bacterial infection of the sinuses). For allergies, dusting and vacuuming often, covering your mattresses and pillowcases, and special air filter can help reduce exposure to allergy triggers.    What about chicken soup?  If you've been told that chicken soup helps with post-nasal drip (or other symptoms of a cold or flu), it's true! But it doesn't actually have to be chicken soup - any hot liquid can help thin the mucus and help you maintain hydration.    When should I call a doctor?  In most cases, post-nasal drip is annoying but not dangerous. However, you should contact your doctor if you have:  Unexplained fever  Bloody mucus  Wheezing or shortness of breath  Foul smelling drainage  Persistent symptoms despite treatment  The bad news/good news about post nasal drip  Post-nasal drip is among the most common causes of persistent cough, hoarseness, sore throat and other annoying symptoms. It can be caused by a number of conditions and may linger for weeks or months. That's the bad news. The good news is that most of the causes can be quickly identified and most will improve with treatment.

## 2019-11-22 NOTE — PROGRESS NOTES
"Subjective:       Patient ID: Kierra Mccain is a 24 y.o. female.    Vitals:  height is 5' 2" (1.575 m) and weight is 58.1 kg (128 lb). Her oral temperature is 100.6 °F (38.1 °C) (abnormal). Her blood pressure is 111/76 and her pulse is 116 (abnormal). Her respiration is 18 and oxygen saturation is 98%.     Chief Complaint: Sinus Problem (X 1 days)    Sinus Problem   This is a new problem. The current episode started yesterday (sudden onset). The problem has been gradually worsening since onset. There has been no fever. Associated symptoms include chills, congestion, coughing, sinus pressure and a sore throat. Pertinent negatives include no headaches or shortness of breath. Past treatments include nothing. The treatment provided no relief.       Constitution: Positive for chills, fatigue and fever.   HENT: Positive for congestion, postnasal drip, sinus pain, sinus pressure and sore throat.    Neck: Negative for painful lymph nodes.   Cardiovascular: Negative for chest pain and leg swelling.   Eyes: Negative for double vision and blurred vision.   Respiratory: Positive for cough. Negative for shortness of breath.    Gastrointestinal: Negative for nausea, vomiting and diarrhea.   Genitourinary: Negative for dysuria, frequency, urgency and history of kidney stones.   Musculoskeletal: Negative for joint pain, joint swelling, muscle cramps and muscle ache.   Skin: Negative for color change, pale, rash and bruising.   Allergic/Immunologic: Negative for seasonal allergies.   Neurological: Negative for dizziness, history of vertigo, light-headedness, passing out and headaches.   Hematologic/Lymphatic: Negative for swollen lymph nodes.   Psychiatric/Behavioral: Negative for nervous/anxious, sleep disturbance and depression. The patient is not nervous/anxious.        Objective:      Physical Exam   Constitutional: She is oriented to person, place, and time. She appears well-developed and well-nourished. She is cooperative.  " Non-toxic appearance. She appears ill. No distress.   HENT:   Head: Normocephalic and atraumatic.   Right Ear: Hearing, external ear and ear canal normal. A middle ear effusion is present.   Left Ear: Hearing, external ear and ear canal normal. A middle ear effusion is present.   Nose: Rhinorrhea present. No mucosal edema or nasal deformity. No epistaxis. Right sinus exhibits maxillary sinus tenderness. Right sinus exhibits no frontal sinus tenderness. Left sinus exhibits maxillary sinus tenderness. Left sinus exhibits no frontal sinus tenderness.   Mouth/Throat: Uvula is midline and mucous membranes are normal. No trismus in the jaw. Normal dentition. No uvula swelling. Posterior oropharyngeal erythema and cobblestoning present.   Eyes: Conjunctivae and lids are normal. Right eye exhibits no discharge. Left eye exhibits no discharge. No scleral icterus.   Neck: Trachea normal, normal range of motion, full passive range of motion without pain and phonation normal. Neck supple.   Cardiovascular: Normal rate, regular rhythm, normal heart sounds, intact distal pulses and normal pulses.   Pulmonary/Chest: Effort normal and breath sounds normal. No respiratory distress.   Abdominal: Soft. Normal appearance and bowel sounds are normal. She exhibits no distension, no pulsatile midline mass and no mass. There is no tenderness.   Musculoskeletal: Normal range of motion. She exhibits no edema or deformity.   Neurological: She is alert and oriented to person, place, and time. She exhibits normal muscle tone. Coordination normal.   Skin: Skin is warm, dry, intact, not diaphoretic and not pale.   Psychiatric: She has a normal mood and affect. Her speech is normal and behavior is normal. Judgment and thought content normal. Cognition and memory are normal.   Nursing note and vitals reviewed.        Assessment:       1. Flu-like symptoms    2. Sinus problem    3. Fever, unspecified fever cause    4. Nausea        Plan:          Flu-like symptoms    Sinus problem  -     POCT Influenza A/B  -     POCT rapid strep A    Fever, unspecified fever cause    Nausea    Other orders  -     dexamethasone injection 8 mg  -     oseltamivir (TAMIFLU) 75 MG capsule; Take 1 capsule (75 mg total) by mouth 2 (two) times daily. for 5 days  Dispense: 10 capsule; Refill: 0  -     promethazine (PHENERGAN) 25 MG tablet; Take 1 tablet (25 mg total) by mouth every 4 (four) hours as needed for Nausea.  Dispense: 30 tablet; Refill: 0  -     predniSONE (DELTASONE) 20 MG tablet; Take 1 tablet (20 mg total) by mouth 2 (two) times daily. for 5 days  Dispense: 10 tablet; Refill: 0  -     acetaminophen tablet 1,000 mg

## 2019-12-19 NOTE — ED NOTES
No changes noted / no distress noted   Imiquimod Counseling:  I discussed with the patient the risks of imiquimod including but not limited to erythema, scaling, itching, weeping, crusting, and pain.  Patient understands that the inflammatory response to imiquimod is variable from person to person and was educated regarded proper titration schedule.  If flu-like symptoms develop, patient knows to discontinue the medication and contact us.

## 2021-02-12 ENCOUNTER — HOSPITAL ENCOUNTER (OUTPATIENT)
Dept: PREADMISSION TESTING | Facility: OTHER | Age: 26
Discharge: HOME | End: 2021-02-12
Attending: PLASTIC SURGERY
Payer: MEDICAID

## 2021-02-12 ENCOUNTER — ANESTHESIA EVENT (OUTPATIENT)
Dept: SURGERY | Facility: OTHER | Age: 26
End: 2021-02-12
Payer: MEDICAID

## 2021-02-12 VITALS
WEIGHT: 173.31 LBS | OXYGEN SATURATION: 98 % | HEART RATE: 98 BPM | SYSTOLIC BLOOD PRESSURE: 129 MMHG | HEIGHT: 62 IN | DIASTOLIC BLOOD PRESSURE: 71 MMHG | TEMPERATURE: 97 F | BODY MASS INDEX: 31.89 KG/M2

## 2021-02-12 DIAGNOSIS — Z01.818 PRE-OP TESTING: ICD-10-CM

## 2021-02-12 PROCEDURE — U0005 INFEC AGEN DETEC AMPLI PROBE: HCPCS

## 2021-02-12 PROCEDURE — U0003 INFECTIOUS AGENT DETECTION BY NUCLEIC ACID (DNA OR RNA); SEVERE ACUTE RESPIRATORY SYNDROME CORONAVIRUS 2 (SARS-COV-2) (CORONAVIRUS DISEASE [COVID-19]), AMPLIFIED PROBE TECHNIQUE, MAKING USE OF HIGH THROUGHPUT TECHNOLOGIES AS DESCRIBED BY CMS-2020-01-R: HCPCS

## 2021-02-12 RX ORDER — ALPRAZOLAM 0.5 MG/1
0.5 TABLET ORAL
Status: CANCELLED | OUTPATIENT
Start: 2021-02-12 | End: 2021-02-12

## 2021-02-12 RX ORDER — ALBUTEROL SULFATE 90 UG/1
2 AEROSOL, METERED RESPIRATORY (INHALATION) EVERY 6 HOURS PRN
COMMUNITY

## 2021-02-12 RX ORDER — HYDROXYZINE HYDROCHLORIDE 50 MG/1
50 TABLET, FILM COATED ORAL NIGHTLY PRN
COMMUNITY
End: 2023-08-24

## 2021-02-12 RX ORDER — LIDOCAINE HYDROCHLORIDE 10 MG/ML
0.5 INJECTION, SOLUTION EPIDURAL; INFILTRATION; INTRACAUDAL; PERINEURAL ONCE
Status: CANCELLED | OUTPATIENT
Start: 2021-02-12 | End: 2021-02-12

## 2021-02-12 RX ORDER — SODIUM CHLORIDE, SODIUM LACTATE, POTASSIUM CHLORIDE, CALCIUM CHLORIDE 600; 310; 30; 20 MG/100ML; MG/100ML; MG/100ML; MG/100ML
INJECTION, SOLUTION INTRAVENOUS CONTINUOUS
Status: CANCELLED | OUTPATIENT
Start: 2021-02-12

## 2021-02-12 RX ORDER — ALBUTEROL SULFATE 2.5 MG/.5ML
2.5 SOLUTION RESPIRATORY (INHALATION)
Status: CANCELLED | OUTPATIENT
Start: 2021-02-12 | End: 2021-02-12

## 2021-02-12 RX ORDER — PROPRANOLOL HYDROCHLORIDE 20 MG/1
20 TABLET ORAL
COMMUNITY

## 2021-02-13 LAB — SARS-COV-2 RNA RESP QL NAA+PROBE: NOT DETECTED

## 2021-02-15 ENCOUNTER — ANESTHESIA (OUTPATIENT)
Dept: SURGERY | Facility: OTHER | Age: 26
End: 2021-02-15
Payer: MEDICAID

## 2021-02-15 ENCOUNTER — HOSPITAL ENCOUNTER (OUTPATIENT)
Facility: OTHER | Age: 26
Discharge: HOME | End: 2021-02-15
Attending: PLASTIC SURGERY | Admitting: PLASTIC SURGERY
Payer: MEDICAID

## 2021-02-15 VITALS
SYSTOLIC BLOOD PRESSURE: 142 MMHG | RESPIRATION RATE: 16 BRPM | HEART RATE: 109 BPM | TEMPERATURE: 98 F | OXYGEN SATURATION: 97 % | DIASTOLIC BLOOD PRESSURE: 77 MMHG

## 2021-02-15 DIAGNOSIS — F64.9 GENDER IDENTITY DISORDER: Primary | ICD-10-CM

## 2021-02-15 DIAGNOSIS — Z01.818 PRE-OP TESTING: ICD-10-CM

## 2021-02-15 PROCEDURE — 90686 IIV4 VACC NO PRSV 0.5 ML IM: CPT | Performed by: PLASTIC SURGERY

## 2021-02-15 PROCEDURE — 37000009 HC ANESTHESIA EA ADD 15 MINS: Performed by: PLASTIC SURGERY

## 2021-02-15 PROCEDURE — 71000016 HC POSTOP RECOV ADDL HR: Performed by: PLASTIC SURGERY

## 2021-02-15 PROCEDURE — 94761 N-INVAS EAR/PLS OXIMETRY MLT: CPT

## 2021-02-15 PROCEDURE — 36000706: Performed by: PLASTIC SURGERY

## 2021-02-15 PROCEDURE — 88305 TISSUE EXAM BY PATHOLOGIST: ICD-10-PCS | Mod: 26,,, | Performed by: PATHOLOGY

## 2021-02-15 PROCEDURE — 63600175 PHARM REV CODE 636 W HCPCS: Performed by: PLASTIC SURGERY

## 2021-02-15 PROCEDURE — 63600175 PHARM REV CODE 636 W HCPCS: Performed by: NURSE ANESTHETIST, CERTIFIED REGISTERED

## 2021-02-15 PROCEDURE — 88305 TISSUE EXAM BY PATHOLOGIST: CPT | Mod: 59 | Performed by: PATHOLOGY

## 2021-02-15 PROCEDURE — 71000033 HC RECOVERY, INTIAL HOUR: Performed by: PLASTIC SURGERY

## 2021-02-15 PROCEDURE — 36000707: Performed by: PLASTIC SURGERY

## 2021-02-15 PROCEDURE — 00402 ANES INTEG SYS RCNSTV BREAST: CPT | Performed by: PLASTIC SURGERY

## 2021-02-15 PROCEDURE — 25000003 PHARM REV CODE 250: Performed by: NURSE ANESTHETIST, CERTIFIED REGISTERED

## 2021-02-15 PROCEDURE — 90471 IMMUNIZATION ADMIN: CPT | Performed by: PLASTIC SURGERY

## 2021-02-15 PROCEDURE — 88305 TISSUE EXAM BY PATHOLOGIST: CPT | Mod: 26,,, | Performed by: PATHOLOGY

## 2021-02-15 PROCEDURE — 25000003 PHARM REV CODE 250: Performed by: ANESTHESIOLOGY

## 2021-02-15 PROCEDURE — 25000003 PHARM REV CODE 250: Performed by: PLASTIC SURGERY

## 2021-02-15 PROCEDURE — 94640 AIRWAY INHALATION TREATMENT: CPT | Mod: 59

## 2021-02-15 PROCEDURE — 37000008 HC ANESTHESIA 1ST 15 MINUTES: Performed by: PLASTIC SURGERY

## 2021-02-15 PROCEDURE — 71000015 HC POSTOP RECOV 1ST HR: Performed by: PLASTIC SURGERY

## 2021-02-15 PROCEDURE — C1729 CATH, DRAINAGE: HCPCS | Performed by: PLASTIC SURGERY

## 2021-02-15 PROCEDURE — 25000242 PHARM REV CODE 250 ALT 637 W/ HCPCS: Performed by: ANESTHESIOLOGY

## 2021-02-15 RX ORDER — ACETAMINOPHEN 10 MG/ML
INJECTION, SOLUTION INTRAVENOUS
Status: DISCONTINUED | OUTPATIENT
Start: 2021-02-15 | End: 2021-02-15

## 2021-02-15 RX ORDER — OXYCODONE AND ACETAMINOPHEN 10; 325 MG/1; MG/1
1 TABLET ORAL EVERY 4 HOURS PRN
Qty: 30 TABLET | Refills: 0 | Status: SHIPPED | OUTPATIENT
Start: 2021-02-15

## 2021-02-15 RX ORDER — FENTANYL CITRATE 50 UG/ML
INJECTION, SOLUTION INTRAMUSCULAR; INTRAVENOUS
Status: DISCONTINUED | OUTPATIENT
Start: 2021-02-15 | End: 2021-02-15

## 2021-02-15 RX ORDER — DEXAMETHASONE SODIUM PHOSPHATE 4 MG/ML
INJECTION, SOLUTION INTRA-ARTICULAR; INTRALESIONAL; INTRAMUSCULAR; INTRAVENOUS; SOFT TISSUE
Status: DISCONTINUED | OUTPATIENT
Start: 2021-02-15 | End: 2021-02-15

## 2021-02-15 RX ORDER — HYDROMORPHONE HYDROCHLORIDE 2 MG/ML
0.4 INJECTION, SOLUTION INTRAMUSCULAR; INTRAVENOUS; SUBCUTANEOUS EVERY 5 MIN PRN
Status: DISCONTINUED | OUTPATIENT
Start: 2021-02-15 | End: 2021-02-15 | Stop reason: HOSPADM

## 2021-02-15 RX ORDER — CEPHALEXIN 250 MG/1
500 CAPSULE ORAL EVERY 12 HOURS
Qty: 28 CAPSULE | Refills: 0 | Status: SHIPPED | OUTPATIENT
Start: 2021-02-15 | End: 2021-02-22

## 2021-02-15 RX ORDER — PROPOFOL 10 MG/ML
VIAL (ML) INTRAVENOUS
Status: DISCONTINUED | OUTPATIENT
Start: 2021-02-15 | End: 2021-02-15

## 2021-02-15 RX ORDER — KETAMINE HCL IN 0.9 % NACL 50 MG/5 ML
SYRINGE (ML) INTRAVENOUS
Status: DISCONTINUED | OUTPATIENT
Start: 2021-02-15 | End: 2021-02-15

## 2021-02-15 RX ORDER — OXYCODONE HYDROCHLORIDE 5 MG/1
5 TABLET ORAL
Status: DISCONTINUED | OUTPATIENT
Start: 2021-02-15 | End: 2021-02-15 | Stop reason: HOSPADM

## 2021-02-15 RX ORDER — CEFAZOLIN SODIUM 1 G/3ML
2 INJECTION, POWDER, FOR SOLUTION INTRAMUSCULAR; INTRAVENOUS
Status: COMPLETED | OUTPATIENT
Start: 2021-02-15 | End: 2021-02-15

## 2021-02-15 RX ORDER — LIDOCAINE HYDROCHLORIDE AND EPINEPHRINE 10; 10 MG/ML; UG/ML
INJECTION, SOLUTION INFILTRATION; PERINEURAL
Status: DISCONTINUED | OUTPATIENT
Start: 2021-02-15 | End: 2021-02-15 | Stop reason: HOSPADM

## 2021-02-15 RX ORDER — LIDOCAINE HCL/PF 100 MG/5ML
SYRINGE (ML) INTRAVENOUS
Status: DISCONTINUED | OUTPATIENT
Start: 2021-02-15 | End: 2021-02-15

## 2021-02-15 RX ORDER — ROCURONIUM BROMIDE 10 MG/ML
INJECTION, SOLUTION INTRAVENOUS
Status: DISCONTINUED | OUTPATIENT
Start: 2021-02-15 | End: 2021-02-15

## 2021-02-15 RX ORDER — SODIUM CHLORIDE, SODIUM LACTATE, POTASSIUM CHLORIDE, CALCIUM CHLORIDE 600; 310; 30; 20 MG/100ML; MG/100ML; MG/100ML; MG/100ML
INJECTION, SOLUTION INTRAVENOUS CONTINUOUS
Status: DISCONTINUED | OUTPATIENT
Start: 2021-02-15 | End: 2021-02-15 | Stop reason: HOSPADM

## 2021-02-15 RX ORDER — MIDAZOLAM HYDROCHLORIDE 5 MG/ML
INJECTION INTRAMUSCULAR; INTRAVENOUS
Status: DISCONTINUED | OUTPATIENT
Start: 2021-02-15 | End: 2021-02-15

## 2021-02-15 RX ORDER — MEPERIDINE HYDROCHLORIDE 25 MG/ML
12.5 INJECTION INTRAMUSCULAR; INTRAVENOUS; SUBCUTANEOUS ONCE AS NEEDED
Status: DISCONTINUED | OUTPATIENT
Start: 2021-02-15 | End: 2021-02-15 | Stop reason: HOSPADM

## 2021-02-15 RX ORDER — ONDANSETRON HYDROCHLORIDE 2 MG/ML
INJECTION, SOLUTION INTRAMUSCULAR; INTRAVENOUS
Status: DISCONTINUED | OUTPATIENT
Start: 2021-02-15 | End: 2021-02-15

## 2021-02-15 RX ORDER — ONDANSETRON 2 MG/ML
4 INJECTION INTRAMUSCULAR; INTRAVENOUS DAILY PRN
Status: DISCONTINUED | OUTPATIENT
Start: 2021-02-15 | End: 2021-02-15 | Stop reason: HOSPADM

## 2021-02-15 RX ORDER — SODIUM CHLORIDE 0.9 % (FLUSH) 0.9 %
3 SYRINGE (ML) INJECTION
Status: DISCONTINUED | OUTPATIENT
Start: 2021-02-15 | End: 2021-02-15 | Stop reason: HOSPADM

## 2021-02-15 RX ORDER — ALBUTEROL SULFATE 2.5 MG/.5ML
2.5 SOLUTION RESPIRATORY (INHALATION)
Status: COMPLETED | OUTPATIENT
Start: 2021-02-15 | End: 2021-02-15

## 2021-02-15 RX ORDER — LIDOCAINE HYDROCHLORIDE 10 MG/ML
0.5 INJECTION, SOLUTION EPIDURAL; INFILTRATION; INTRACAUDAL; PERINEURAL ONCE
Status: DISCONTINUED | OUTPATIENT
Start: 2021-02-15 | End: 2021-02-15 | Stop reason: HOSPADM

## 2021-02-15 RX ORDER — ALPRAZOLAM 0.5 MG/1
0.5 TABLET ORAL
Status: COMPLETED | OUTPATIENT
Start: 2021-02-15 | End: 2021-02-15

## 2021-02-15 RX ADMIN — MIDAZOLAM 2 MG: 5 INJECTION INTRAMUSCULAR; INTRAVENOUS at 07:02

## 2021-02-15 RX ADMIN — FENTANYL CITRATE 50 MCG: 50 INJECTION, SOLUTION INTRAMUSCULAR; INTRAVENOUS at 07:02

## 2021-02-15 RX ADMIN — SUGAMMADEX 200 MG: 100 INJECTION, SOLUTION INTRAVENOUS at 08:02

## 2021-02-15 RX ADMIN — LIDOCAINE HYDROCHLORIDE 60 MG: 20 INJECTION, SOLUTION INTRAVENOUS at 07:02

## 2021-02-15 RX ADMIN — FENTANYL CITRATE 50 MCG: 50 INJECTION, SOLUTION INTRAMUSCULAR; INTRAVENOUS at 09:02

## 2021-02-15 RX ADMIN — ACETAMINOPHEN 1000 MG: 10 INJECTION, SOLUTION INTRAVENOUS at 07:02

## 2021-02-15 RX ADMIN — FENTANYL CITRATE 100 MCG: 50 INJECTION, SOLUTION INTRAMUSCULAR; INTRAVENOUS at 07:02

## 2021-02-15 RX ADMIN — ONDANSETRON 4 MG: 2 INJECTION, SOLUTION INTRAMUSCULAR; INTRAVENOUS at 07:02

## 2021-02-15 RX ADMIN — CEFAZOLIN 2 G: 330 INJECTION, POWDER, FOR SOLUTION INTRAMUSCULAR; INTRAVENOUS at 07:02

## 2021-02-15 RX ADMIN — PROPOFOL 180 MG: 10 INJECTION, EMULSION INTRAVENOUS at 07:02

## 2021-02-15 RX ADMIN — ALPRAZOLAM 0.5 MG: 0.5 TABLET ORAL at 05:02

## 2021-02-15 RX ADMIN — ROCURONIUM BROMIDE 40 MG: 10 INJECTION, SOLUTION INTRAVENOUS at 07:02

## 2021-02-15 RX ADMIN — ALBUTEROL SULFATE 2.5 MG: 2.5 SOLUTION RESPIRATORY (INHALATION) at 05:02

## 2021-02-15 RX ADMIN — DEXAMETHASONE SODIUM PHOSPHATE 8 MG: 4 INJECTION, SOLUTION INTRAMUSCULAR; INTRAVENOUS at 07:02

## 2021-02-15 RX ADMIN — OXYCODONE 5 MG: 5 TABLET ORAL at 09:02

## 2021-02-15 RX ADMIN — INFLUENZA VIRUS VACCINE 0.5 ML: 15; 15; 15; 15 SUSPENSION INTRAMUSCULAR at 10:02

## 2021-02-15 RX ADMIN — Medication 25 MG: at 07:02

## 2021-02-16 ENCOUNTER — HOSPITAL ENCOUNTER (EMERGENCY)
Facility: HOSPITAL | Age: 26
Discharge: HOME | End: 2021-02-16
Attending: EMERGENCY MEDICINE
Payer: MEDICAID

## 2021-02-16 VITALS
OXYGEN SATURATION: 99 % | TEMPERATURE: 99 F | SYSTOLIC BLOOD PRESSURE: 127 MMHG | BODY MASS INDEX: 31.28 KG/M2 | RESPIRATION RATE: 16 BRPM | DIASTOLIC BLOOD PRESSURE: 81 MMHG | HEART RATE: 102 BPM | HEIGHT: 62 IN | WEIGHT: 170 LBS

## 2021-02-16 DIAGNOSIS — R07.89 CHEST WALL PAIN: ICD-10-CM

## 2021-02-16 DIAGNOSIS — Z51.89 VISIT FOR WOUND CHECK: ICD-10-CM

## 2021-02-16 DIAGNOSIS — G89.18 POST-OPERATIVE PAIN: Primary | ICD-10-CM

## 2021-02-16 PROCEDURE — 99282 EMERGENCY DEPT VISIT SF MDM: CPT

## 2021-02-18 LAB
FINAL PATHOLOGIC DIAGNOSIS: NORMAL
GROSS: NORMAL
Lab: NORMAL

## 2021-04-29 ENCOUNTER — PATIENT MESSAGE (OUTPATIENT)
Dept: RESEARCH | Facility: HOSPITAL | Age: 26
End: 2021-04-29

## 2023-07-24 ENCOUNTER — HOSPITAL ENCOUNTER (EMERGENCY)
Facility: HOSPITAL | Age: 28
Discharge: HOME | End: 2023-07-25
Attending: STUDENT IN AN ORGANIZED HEALTH CARE EDUCATION/TRAINING PROGRAM
Payer: MEDICAID

## 2023-07-24 DIAGNOSIS — R11.2 NAUSEA AND VOMITING, UNSPECIFIED VOMITING TYPE: Primary | ICD-10-CM

## 2023-07-24 DIAGNOSIS — R51.9 ACUTE NONINTRACTABLE HEADACHE, UNSPECIFIED HEADACHE TYPE: ICD-10-CM

## 2023-07-24 LAB
INFLUENZA A, MOLECULAR: NEGATIVE
INFLUENZA B, MOLECULAR: NEGATIVE
SARS-COV-2 RDRP RESP QL NAA+PROBE: NEGATIVE
SPECIMEN SOURCE: NORMAL

## 2023-07-24 PROCEDURE — U0002 COVID-19 LAB TEST NON-CDC: HCPCS | Performed by: STUDENT IN AN ORGANIZED HEALTH CARE EDUCATION/TRAINING PROGRAM

## 2023-07-24 PROCEDURE — 99283 EMERGENCY DEPT VISIT LOW MDM: CPT

## 2023-07-24 PROCEDURE — 87502 INFLUENZA DNA AMP PROBE: CPT | Performed by: STUDENT IN AN ORGANIZED HEALTH CARE EDUCATION/TRAINING PROGRAM

## 2023-07-24 PROCEDURE — 25000003 PHARM REV CODE 250: Performed by: STUDENT IN AN ORGANIZED HEALTH CARE EDUCATION/TRAINING PROGRAM

## 2023-07-24 RX ORDER — ONDANSETRON 4 MG/1
8 TABLET, ORALLY DISINTEGRATING ORAL
Status: COMPLETED | OUTPATIENT
Start: 2023-07-24 | End: 2023-07-24

## 2023-07-24 RX ADMIN — ONDANSETRON 8 MG: 4 TABLET, ORALLY DISINTEGRATING ORAL at 11:07

## 2023-07-25 VITALS
OXYGEN SATURATION: 99 % | SYSTOLIC BLOOD PRESSURE: 129 MMHG | BODY MASS INDEX: 23.92 KG/M2 | DIASTOLIC BLOOD PRESSURE: 79 MMHG | HEIGHT: 62 IN | TEMPERATURE: 98 F | WEIGHT: 130 LBS | HEART RATE: 103 BPM | RESPIRATION RATE: 16 BRPM

## 2023-07-25 LAB
ALLENS TEST: POSITIVE
CORRECTED TEMPERATURE (PCO2): 44.1 MMHG
CORRECTED TEMPERATURE (PH): 7.39
CORRECTED TEMPERATURE (PO2): 85.7 MMHG
FIO2: 21 %
HGB BLD-MCNC: 15 G/DL
O2DEVICE: NORMAL
PCO2 BLDA: 44.1 MMHG (ref 35–45)
PH SMN: 7.39 [PH] (ref 7.35–7.45)
PO2 BLDA: 85.7 MMHG (ref 80–100)
POC BASE DEFICIT: 2.1 MMOL/L (ref -2–2)
POC COHB: 0.9 % (ref 0–5)
POC HCO3: 27 MMOL/L (ref 24–28)
POC METHB: 0.9 % (ref 0–3)
POC O2HB: 94.6 %
POC PERFORMED BY: NORMAL
POC SATURATED O2: 96.4 % (ref 95–100)
POC TEMPERATURE: 37 C
SPECIMEN SOURCE: NORMAL

## 2023-07-25 PROCEDURE — 99900035 HC TECH TIME PER 15 MIN (STAT)

## 2023-07-25 PROCEDURE — 25000003 PHARM REV CODE 250: Performed by: STUDENT IN AN ORGANIZED HEALTH CARE EDUCATION/TRAINING PROGRAM

## 2023-07-25 PROCEDURE — 82803 BLOOD GASES ANY COMBINATION: CPT | Mod: 59

## 2023-07-25 PROCEDURE — 36600 WITHDRAWAL OF ARTERIAL BLOOD: CPT

## 2023-07-25 PROCEDURE — 82805 BLOOD GASES W/O2 SATURATION: CPT

## 2023-07-25 RX ORDER — ACETAMINOPHEN 500 MG
1000 TABLET ORAL
Status: COMPLETED | OUTPATIENT
Start: 2023-07-25 | End: 2023-07-25

## 2023-07-25 RX ORDER — ONDANSETRON 4 MG/1
8 TABLET, ORALLY DISINTEGRATING ORAL EVERY 8 HOURS PRN
Qty: 12 TABLET | Refills: 0 | Status: SHIPPED | OUTPATIENT
Start: 2023-07-25

## 2023-07-25 RX ADMIN — ACETAMINOPHEN 1000 MG: 500 TABLET ORAL at 12:07

## 2023-07-25 NOTE — ED PROVIDER NOTES
Encounter Date: 7/24/2023       History     Chief Complaint   Patient presents with    Vomiting    Nausea     HEADACHE, N/V THAT STARTED AT 9 TONIGHT     HPI    27-year-old female with a male transgender patient presents to the ER with complaint of acute onset nausea and vomiting that began tonight at 9:00 p.m..  Everyone else in the home is also experiencing similar symptoms.  Mild headache.  Ate a premade burrito for dinner this evening, the not share any food items with other family members who are in the home.  Denies fever, chills.  Had episode of diaphoresis with emesis.  Patient's father present at the bedside with same symptoms and leaves to vomit and have a diarrheal episode during evaluation in ED restroom.  Patient without diarrhea.  No cough, congestion or known sick contacts.    Review of patient's allergies indicates:  No Known Allergies  Past Medical History:   Diagnosis Date    Anxiety 2015    Asthma     Bulging disc     Cannabis use disorder, mild, abuse 8/2/2019    History of suicidal ideation     patient reports suicide attempt x 2    Panic disorder 2016    Protrusion of lumbar intervertebral disc      Past Surgical History:   Procedure Laterality Date    BILATERAL MASTECTOMY Bilateral 2/15/2021    Procedure: MASTECTOMY, BILATERAL WITH BILATERAL FREE NIPPLE GRAFTING;  Surgeon: Yadiel Landin MD;  Location: [facility];  Service: Plastics;  Laterality: Bilateral;    DE QUERVAIN'S RELEASE Right 07/11/2019    RECONSTRUCTION OF NIPPLE  2/15/2021    Procedure: RECONSTRUCTION, NIPPLE;  Surgeon: Yadiel Landin MD;  Location: [facility];  Service: Plastics;;     Family History   Problem Relation Age of Onset    Heart disease Paternal Grandmother      Social History     Tobacco Use    Smoking status: Never    Smokeless tobacco: Never   Substance Use Topics    Alcohol use: Yes     Comment: rare    Drug use: Yes     Types: Marijuana     Comment: occasional     Review of Systems   Constitutional:  Negative for  activity change, fatigue and fever.   HENT:  Negative for congestion and sore throat.    Respiratory:  Negative for cough and wheezing.    Cardiovascular:  Negative for chest pain.   Gastrointestinal:  Positive for nausea and vomiting. Negative for diarrhea.   Genitourinary:  Negative for dysuria and flank pain.   Musculoskeletal:  Negative for arthralgias, back pain and myalgias.   Skin:  Negative for rash.   Neurological:  Positive for headaches. Negative for weakness.   Hematological:  Does not bruise/bleed easily.     Physical Exam     Initial Vitals [07/24/23 2235]   BP Pulse Resp Temp SpO2   130/83 102 20 97.8 °F (36.6 °C) 97 %      MAP       --         Physical Exam    Nursing note and vitals reviewed.  Constitutional: She appears well-developed and well-nourished. She is not diaphoretic. No distress.   HENT:   Head: Normocephalic and atraumatic.   Right Ear: External ear normal.   Left Ear: External ear normal.   Eyes: Right eye exhibits no discharge. Left eye exhibits no discharge. No scleral icterus.   Neck: Neck supple.   Normal range of motion.  Cardiovascular:  Normal rate, regular rhythm, normal heart sounds and intact distal pulses.     Exam reveals no gallop and no friction rub.       No murmur heard.  Pulmonary/Chest: Breath sounds normal. No respiratory distress.   Abdominal: Abdomen is soft. She exhibits no distension. There is no abdominal tenderness. There is no guarding.   Musculoskeletal:         General: No tenderness or edema. Normal range of motion.      Cervical back: Normal range of motion and neck supple.     Neurological: She is alert and oriented to person, place, and time. GCS score is 15. GCS eye subscore is 4. GCS verbal subscore is 5. GCS motor subscore is 6.   Skin: Skin is warm and dry.   Psychiatric: She has a normal mood and affect. Thought content normal.       ED Course   Procedures  Labs Reviewed   SARS-COV-2 RNA AMPLIFICATION, QUAL   INFLUENZA A AND B ANTIGEN    Narrative:      Specimen Source->Nasopharyngeal Swab          Imaging Results    None          Medications   ondansetron disintegrating tablet 8 mg (8 mg Oral Given 7/24/23 2317)   acetaminophen tablet 1,000 mg (1,000 mg Oral Given 7/25/23 0046)           APC / Resident Notes:   27-year-old presents to the ER with acute onset nausea, vomiting as well as mild headache.  Entire family who was at home has same symptoms.  Denies significant carbon monoxide exposure.  Family did not share same meal and symptoms began acutely at the same time in all members.  High concern for viral gastroenteritis at this time given the father is at the bedside is currently having diarrheal episodes.  Carbon dioxide poisoning is considered given presents with headache as well as nausea and vomiting with onset of sudden symptoms without otherwise same exposure with his family members who were in the same home.  ABG obtained returns without elevated carboxyhemoglobin.  Given Zofran for symptomatic treatment with improvement.  Tolerates oral liquids in the ED.  Given Tylenol for headache.  Symptoms improved, advised conservative treatment at home with prescription provided for Zofran.  Appropriate for discharge, discharged in stable condition after all questions were answered.    Jarrod Aguilar DO  \[facility]\"" Internal Medicine/Emergency Medicine HO-IV    3:42 AM 07/25/2023           Attending Attestation:   Physician Attestation Statement for Resident:  As the supervising MD   I agree with the above history.  -:   As the supervising MD I agree with the above PE.     As the supervising MD I agree with the above treatment, course, plan, and disposition.                  ED Course as of 07/25/23 2156   Mon Jul 24, 2023 2347 SARS-CoV-2 RNA, Amplification, Qual: Negative [MJ]   2347 Influenza A, Molecular: Negative [MJ]   2348 Influenza B, Molecular: Negative [MJ]   e Jul 25, 2023   0005 POC COHb: 0.9 [MJ]      ED Course User Index  [MJ] Jarrod Lacy  DO Jeff                 Clinical Impression:   Final diagnoses:  [R11.2] Nausea and vomiting, unspecified vomiting type (Primary)  [R51.9] Acute nonintractable headache, unspecified headache type        ED Disposition Condition    Discharge Stable          ED Prescriptions       Medication Sig Dispense Start Date End Date Auth. Provider    ondansetron (ZOFRAN-ODT) 4 MG TbDL Take 2 tablets (8 mg total) by mouth every 8 (eight) hours as needed (nausea, vomiting). 12 tablet 7/25/2023 -- Jarrod Aguilar DO          Follow-up Information       Follow up With Specialties Details Why Contact Info Additional Information    Litzy Barcenas MD Internal Medicine Go to  As needed [address]  [address]  [facility] [address]  [phone]       [facility] - Emergency Dept Emergency Medicine Go to  As needed, If symptoms worsen [address] [facility] [address]  [phone] 1st floor             Jarrod Aguilar DO  Resident  07/25/23 0852       Chi Clemens MD  07/25/23 9150

## 2023-07-25 NOTE — DISCHARGE INSTRUCTIONS
Use Zofran every 8 hours as needed for nausea and vomiting.  Ensure you stay hydrated.  If you develop diarrhea you may use over-the-counter Imodium to help with symptoms.  Eat bland foods to start and then expand diet a few remained feeling well.  If symptoms do not improve in the next 3-4 days please obtain an appointment with your primary care physician although I expect that they will resolve rapidly.

## 2023-08-24 ENCOUNTER — HOSPITAL ENCOUNTER (OUTPATIENT)
Facility: HOSPITAL | Age: 28
Discharge: PSYCHIATRIC OR SUBSTANCE USE FACILITY | End: 2023-08-26
Attending: EMERGENCY MEDICINE | Admitting: HOSPITALIST
Payer: MEDICAID

## 2023-08-24 DIAGNOSIS — R07.9 CHEST PAIN: ICD-10-CM

## 2023-08-24 DIAGNOSIS — T14.91XA SUICIDE ATTEMPT: ICD-10-CM

## 2023-08-24 DIAGNOSIS — T54.92XA INGESTION OF BLEACH, INTENTIONAL SELF-HARM, INITIAL ENCOUNTER: Primary | ICD-10-CM

## 2023-08-24 PROBLEM — M54.50 LOW BACK PAIN: Status: ACTIVE | Noted: 2023-08-24

## 2023-08-24 LAB
ALBUMIN SERPL BCP-MCNC: 4.5 G/DL (ref 3.5–5.2)
ALP SERPL-CCNC: 54 U/L (ref 55–135)
ALT SERPL W/O P-5'-P-CCNC: 17 U/L (ref 10–44)
AMPHET+METHAMPHET UR QL: NEGATIVE
ANION GAP SERPL CALC-SCNC: 15 MMOL/L (ref 8–16)
APAP SERPL-MCNC: <3 UG/ML (ref 10–20)
AST SERPL-CCNC: 17 U/L (ref 10–40)
BACTERIA #/AREA URNS HPF: ABNORMAL /HPF
BARBITURATES UR QL SCN>200 NG/ML: NEGATIVE
BASOPHILS # BLD AUTO: 0.02 K/UL (ref 0–0.2)
BASOPHILS NFR BLD: 0.2 % (ref 0–1.9)
BENZODIAZ UR QL SCN>200 NG/ML: NEGATIVE
BILIRUB SERPL-MCNC: 0.8 MG/DL (ref 0.1–1)
BILIRUB UR QL STRIP: NEGATIVE
BUN SERPL-MCNC: 10 MG/DL (ref 6–20)
BZE UR QL SCN: NEGATIVE
CALCIUM SERPL-MCNC: 9.7 MG/DL (ref 8.7–10.5)
CANNABINOIDS UR QL SCN: ABNORMAL
CHLORIDE SERPL-SCNC: 104 MMOL/L (ref 95–110)
CLARITY UR: CLEAR
CO2 SERPL-SCNC: 20 MMOL/L (ref 23–29)
COLOR UR: YELLOW
CREAT SERPL-MCNC: 0.8 MG/DL (ref 0.5–1.4)
CREAT UR-MCNC: 423.1 MG/DL (ref 15–325)
DIFFERENTIAL METHOD: NORMAL
EOSINOPHIL # BLD AUTO: 0 K/UL (ref 0–0.5)
EOSINOPHIL NFR BLD: 0.4 % (ref 0–8)
ERYTHROCYTE [DISTWIDTH] IN BLOOD BY AUTOMATED COUNT: 13.2 % (ref 11.5–14.5)
EST. GFR  (NO RACE VARIABLE): >60 ML/MIN/1.73 M^2
ETHANOL SERPL-MCNC: <10 MG/DL
GLUCOSE SERPL-MCNC: 82 MG/DL (ref 70–110)
GLUCOSE UR QL STRIP: NEGATIVE
HCT VFR BLD AUTO: 39.3 % (ref 37–48.5)
HGB BLD-MCNC: 13.7 G/DL (ref 12–16)
HGB UR QL STRIP: NEGATIVE
HYALINE CASTS #/AREA URNS LPF: 1 /LPF
IMM GRANULOCYTES # BLD AUTO: 0.02 K/UL (ref 0–0.04)
IMM GRANULOCYTES NFR BLD AUTO: 0.2 % (ref 0–0.5)
KETONES UR QL STRIP: ABNORMAL
LEUKOCYTE ESTERASE UR QL STRIP: ABNORMAL
LYMPHOCYTES # BLD AUTO: 1.8 K/UL (ref 1–4.8)
LYMPHOCYTES NFR BLD: 22 % (ref 18–48)
MCH RBC QN AUTO: 30.7 PG (ref 27–31)
MCHC RBC AUTO-ENTMCNC: 34.9 G/DL (ref 32–36)
MCV RBC AUTO: 88 FL (ref 82–98)
METHADONE UR QL SCN>300 NG/ML: NEGATIVE
MICROSCOPIC COMMENT: ABNORMAL
MONOCYTES # BLD AUTO: 0.7 K/UL (ref 0.3–1)
MONOCYTES NFR BLD: 8.1 % (ref 4–15)
NEUTROPHILS # BLD AUTO: 5.6 K/UL (ref 1.8–7.7)
NEUTROPHILS NFR BLD: 69.1 % (ref 38–73)
NITRITE UR QL STRIP: NEGATIVE
NRBC BLD-RTO: 0 /100 WBC
OPIATES UR QL SCN: NEGATIVE
PCP UR QL SCN>25 NG/ML: NEGATIVE
PH UR STRIP: 6 [PH] (ref 5–8)
PLATELET # BLD AUTO: 281 K/UL (ref 150–450)
PMV BLD AUTO: 9.4 FL (ref 9.2–12.9)
POTASSIUM SERPL-SCNC: 3.6 MMOL/L (ref 3.5–5.1)
PROT SERPL-MCNC: 7.7 G/DL (ref 6–8.4)
PROT UR QL STRIP: ABNORMAL
RBC # BLD AUTO: 4.46 M/UL (ref 4–5.4)
RBC #/AREA URNS HPF: 1 /HPF (ref 0–4)
SODIUM SERPL-SCNC: 139 MMOL/L (ref 136–145)
SP GR UR STRIP: >1.03 (ref 1–1.03)
SQUAMOUS #/AREA URNS HPF: 0 /HPF
TOXICOLOGY INFORMATION: ABNORMAL
TSH SERPL DL<=0.005 MIU/L-ACNC: 1.93 UIU/ML (ref 0.4–4)
URN SPEC COLLECT METH UR: ABNORMAL
UROBILINOGEN UR STRIP-ACNC: ABNORMAL EU/DL
WBC # BLD AUTO: 8.15 K/UL (ref 3.9–12.7)
WBC #/AREA URNS HPF: 7 /HPF (ref 0–5)
YEAST URNS QL MICRO: ABNORMAL

## 2023-08-24 PROCEDURE — G0378 HOSPITAL OBSERVATION PER HR: HCPCS

## 2023-08-24 PROCEDURE — C9113 INJ PANTOPRAZOLE SODIUM, VIA: HCPCS | Performed by: NURSE PRACTITIONER

## 2023-08-24 PROCEDURE — 99285 EMERGENCY DEPT VISIT HI MDM: CPT | Mod: 25

## 2023-08-24 PROCEDURE — 25000003 PHARM REV CODE 250: Performed by: NURSE PRACTITIONER

## 2023-08-24 PROCEDURE — 80307 DRUG TEST PRSMV CHEM ANLYZR: CPT | Performed by: EMERGENCY MEDICINE

## 2023-08-24 PROCEDURE — 99900035 HC TECH TIME PER 15 MIN (STAT)

## 2023-08-24 PROCEDURE — 82077 ASSAY SPEC XCP UR&BREATH IA: CPT | Performed by: EMERGENCY MEDICINE

## 2023-08-24 PROCEDURE — 63600175 PHARM REV CODE 636 W HCPCS: Performed by: EMERGENCY MEDICINE

## 2023-08-24 PROCEDURE — 99205 PR OFFICE/OUTPT VISIT, NEW, LEVL V, 60-74 MIN: ICD-10-PCS | Mod: AF,HB,95, | Performed by: PSYCHIATRY & NEUROLOGY

## 2023-08-24 PROCEDURE — C9113 INJ PANTOPRAZOLE SODIUM, VIA: HCPCS | Performed by: EMERGENCY MEDICINE

## 2023-08-24 PROCEDURE — 81000 URINALYSIS NONAUTO W/SCOPE: CPT | Mod: 59 | Performed by: EMERGENCY MEDICINE

## 2023-08-24 PROCEDURE — 80053 COMPREHEN METABOLIC PANEL: CPT | Performed by: EMERGENCY MEDICINE

## 2023-08-24 PROCEDURE — 96365 THER/PROPH/DIAG IV INF INIT: CPT

## 2023-08-24 PROCEDURE — 36415 COLL VENOUS BLD VENIPUNCTURE: CPT | Performed by: EMERGENCY MEDICINE

## 2023-08-24 PROCEDURE — 80143 DRUG ASSAY ACETAMINOPHEN: CPT | Mod: XB | Performed by: EMERGENCY MEDICINE

## 2023-08-24 PROCEDURE — 99205 OFFICE O/P NEW HI 60 MIN: CPT | Mod: AF,HB,95, | Performed by: PSYCHIATRY & NEUROLOGY

## 2023-08-24 PROCEDURE — 96366 THER/PROPH/DIAG IV INF ADDON: CPT

## 2023-08-24 PROCEDURE — 84443 ASSAY THYROID STIM HORMONE: CPT | Performed by: EMERGENCY MEDICINE

## 2023-08-24 PROCEDURE — 25000003 PHARM REV CODE 250: Performed by: EMERGENCY MEDICINE

## 2023-08-24 PROCEDURE — 63600175 PHARM REV CODE 636 W HCPCS: Performed by: NURSE PRACTITIONER

## 2023-08-24 PROCEDURE — 85025 COMPLETE CBC W/AUTO DIFF WBC: CPT | Performed by: EMERGENCY MEDICINE

## 2023-08-24 RX ORDER — IBUPROFEN 200 MG
24 TABLET ORAL
Status: DISCONTINUED | OUTPATIENT
Start: 2023-08-24 | End: 2023-08-26 | Stop reason: HOSPADM

## 2023-08-24 RX ORDER — VENLAFAXINE HYDROCHLORIDE 150 MG/1
150 CAPSULE, EXTENDED RELEASE ORAL 2 TIMES DAILY
Status: DISCONTINUED | OUTPATIENT
Start: 2023-08-24 | End: 2023-08-26 | Stop reason: HOSPADM

## 2023-08-24 RX ORDER — MAG HYDROX/ALUMINUM HYD/SIMETH 200-200-20
30 SUSPENSION, ORAL (FINAL DOSE FORM) ORAL 4 TIMES DAILY PRN
Status: DISCONTINUED | OUTPATIENT
Start: 2023-08-24 | End: 2023-08-26 | Stop reason: HOSPADM

## 2023-08-24 RX ORDER — TRAZODONE HYDROCHLORIDE 100 MG/1
50-100 TABLET ORAL NIGHTLY PRN
COMMUNITY
Start: 2023-08-10 | End: 2023-08-24 | Stop reason: DRUGHIGH

## 2023-08-24 RX ORDER — IPRATROPIUM BROMIDE AND ALBUTEROL SULFATE 2.5; .5 MG/3ML; MG/3ML
3 SOLUTION RESPIRATORY (INHALATION) EVERY 6 HOURS PRN
Status: DISCONTINUED | OUTPATIENT
Start: 2023-08-24 | End: 2023-08-26 | Stop reason: HOSPADM

## 2023-08-24 RX ORDER — AMOXICILLIN 250 MG
1 CAPSULE ORAL 2 TIMES DAILY
Status: DISCONTINUED | OUTPATIENT
Start: 2023-08-24 | End: 2023-08-24

## 2023-08-24 RX ORDER — SODIUM CHLORIDE 0.9 % (FLUSH) 0.9 %
10 SYRINGE (ML) INJECTION EVERY 8 HOURS PRN
Status: DISCONTINUED | OUTPATIENT
Start: 2023-08-24 | End: 2023-08-26 | Stop reason: HOSPADM

## 2023-08-24 RX ORDER — VENLAFAXINE HYDROCHLORIDE 150 MG/1
150 CAPSULE, EXTENDED RELEASE ORAL 2 TIMES DAILY
COMMUNITY
Start: 2023-08-16

## 2023-08-24 RX ORDER — TALC
6 POWDER (GRAM) TOPICAL NIGHTLY PRN
Status: DISCONTINUED | OUTPATIENT
Start: 2023-08-24 | End: 2023-08-26 | Stop reason: HOSPADM

## 2023-08-24 RX ORDER — BUSPIRONE HYDROCHLORIDE 5 MG/1
5 TABLET ORAL 2 TIMES DAILY
Status: DISCONTINUED | OUTPATIENT
Start: 2023-08-25 | End: 2023-08-26 | Stop reason: HOSPADM

## 2023-08-24 RX ORDER — IBUPROFEN 200 MG
16 TABLET ORAL
Status: DISCONTINUED | OUTPATIENT
Start: 2023-08-24 | End: 2023-08-26 | Stop reason: HOSPADM

## 2023-08-24 RX ORDER — SODIUM CHLORIDE 9 MG/ML
INJECTION, SOLUTION INTRAVENOUS CONTINUOUS
Status: ACTIVE | OUTPATIENT
Start: 2023-08-24 | End: 2023-08-25

## 2023-08-24 RX ORDER — VENLAFAXINE HYDROCHLORIDE 37.5 MG/1
150 CAPSULE, EXTENDED RELEASE ORAL 2 TIMES DAILY
Status: CANCELLED | OUTPATIENT
Start: 2023-08-24

## 2023-08-24 RX ORDER — NALOXONE HCL 0.4 MG/ML
0.02 VIAL (ML) INJECTION
Status: DISCONTINUED | OUTPATIENT
Start: 2023-08-24 | End: 2023-08-26 | Stop reason: HOSPADM

## 2023-08-24 RX ORDER — TRAZODONE HYDROCHLORIDE 50 MG/1
25-50 TABLET ORAL NIGHTLY PRN
COMMUNITY
Start: 2023-07-20

## 2023-08-24 RX ORDER — GLUCAGON 1 MG
1 KIT INJECTION
Status: DISCONTINUED | OUTPATIENT
Start: 2023-08-24 | End: 2023-08-26 | Stop reason: HOSPADM

## 2023-08-24 RX ORDER — ONDANSETRON 2 MG/ML
4 INJECTION INTRAMUSCULAR; INTRAVENOUS EVERY 6 HOURS PRN
Status: DISCONTINUED | OUTPATIENT
Start: 2023-08-24 | End: 2023-08-24

## 2023-08-24 RX ORDER — SIMETHICONE 80 MG
1 TABLET,CHEWABLE ORAL 4 TIMES DAILY PRN
Status: DISCONTINUED | OUTPATIENT
Start: 2023-08-24 | End: 2023-08-24

## 2023-08-24 RX ORDER — BUSPIRONE HYDROCHLORIDE 10 MG/1
5 TABLET ORAL 2 TIMES DAILY
COMMUNITY
Start: 2023-08-09

## 2023-08-24 RX ORDER — ACETAMINOPHEN 325 MG/1
650 TABLET ORAL EVERY 8 HOURS PRN
Status: DISCONTINUED | OUTPATIENT
Start: 2023-08-24 | End: 2023-08-26 | Stop reason: HOSPADM

## 2023-08-24 RX ORDER — PROCHLORPERAZINE EDISYLATE 5 MG/ML
5 INJECTION INTRAMUSCULAR; INTRAVENOUS EVERY 6 HOURS PRN
Status: DISCONTINUED | OUTPATIENT
Start: 2023-08-24 | End: 2023-08-24

## 2023-08-24 RX ORDER — PANTOPRAZOLE SODIUM 40 MG/10ML
40 INJECTION, POWDER, LYOPHILIZED, FOR SOLUTION INTRAVENOUS
Status: COMPLETED | OUTPATIENT
Start: 2023-08-24 | End: 2023-08-24

## 2023-08-24 RX ORDER — TRAZODONE HYDROCHLORIDE 50 MG/1
100 TABLET ORAL NIGHTLY PRN
Status: DISCONTINUED | OUTPATIENT
Start: 2023-08-24 | End: 2023-08-26 | Stop reason: HOSPADM

## 2023-08-24 RX ADMIN — PANTOPRAZOLE SODIUM 8 MG/HR: 40 INJECTION, POWDER, FOR SOLUTION INTRAVENOUS at 10:08

## 2023-08-24 RX ADMIN — SODIUM CHLORIDE 8 MG/HR: 900 INJECTION INTRAVENOUS at 05:08

## 2023-08-24 RX ADMIN — TRAZODONE HYDROCHLORIDE 100 MG: 50 TABLET ORAL at 09:08

## 2023-08-24 RX ADMIN — VENLAFAXINE HYDROCHLORIDE 150 MG: 150 CAPSULE, EXTENDED RELEASE ORAL at 11:08

## 2023-08-24 RX ADMIN — SODIUM CHLORIDE: 9 INJECTION, SOLUTION INTRAVENOUS at 10:08

## 2023-08-24 RX ADMIN — PANTOPRAZOLE SODIUM 40 MG: 40 INJECTION, POWDER, LYOPHILIZED, FOR SOLUTION INTRAVENOUS at 05:08
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
## 2023-08-24 NOTE — ED NOTES
Per Mobile Crisis Team, [facility] is holding a bed for pt when medically cleared

## 2023-08-25 ENCOUNTER — ANESTHESIA EVENT (OUTPATIENT)
Dept: ENDOSCOPY | Facility: HOSPITAL | Age: 28
End: 2023-08-25
Payer: MEDICAID

## 2023-08-25 ENCOUNTER — ANESTHESIA (OUTPATIENT)
Dept: ENDOSCOPY | Facility: HOSPITAL | Age: 28
End: 2023-08-25
Payer: MEDICAID

## 2023-08-25 LAB
ALBUMIN SERPL BCP-MCNC: 3.7 G/DL (ref 3.5–5.2)
ALP SERPL-CCNC: 48 U/L (ref 55–135)
ALT SERPL W/O P-5'-P-CCNC: 14 U/L (ref 10–44)
ANION GAP SERPL CALC-SCNC: 13 MMOL/L (ref 8–16)
AST SERPL-CCNC: 15 U/L (ref 10–40)
BASOPHILS # BLD AUTO: 0.02 K/UL (ref 0–0.2)
BASOPHILS NFR BLD: 0.3 % (ref 0–1.9)
BILIRUB SERPL-MCNC: 1.1 MG/DL (ref 0.1–1)
BUN SERPL-MCNC: 8 MG/DL (ref 6–20)
CALCIUM SERPL-MCNC: 8.8 MG/DL (ref 8.7–10.5)
CHLORIDE SERPL-SCNC: 107 MMOL/L (ref 95–110)
CO2 SERPL-SCNC: 18 MMOL/L (ref 23–29)
CREAT SERPL-MCNC: 0.7 MG/DL (ref 0.5–1.4)
DIFFERENTIAL METHOD: ABNORMAL
EOSINOPHIL # BLD AUTO: 0.1 K/UL (ref 0–0.5)
EOSINOPHIL NFR BLD: 1.2 % (ref 0–8)
ERYTHROCYTE [DISTWIDTH] IN BLOOD BY AUTOMATED COUNT: 13.4 % (ref 11.5–14.5)
EST. GFR  (NO RACE VARIABLE): >60 ML/MIN/1.73 M^2
GLUCOSE SERPL-MCNC: 54 MG/DL (ref 70–110)
HCT VFR BLD AUTO: 35.7 % (ref 37–48.5)
HGB BLD-MCNC: 11.4 G/DL (ref 12–16)
IMM GRANULOCYTES # BLD AUTO: 0.01 K/UL (ref 0–0.04)
IMM GRANULOCYTES NFR BLD AUTO: 0.2 % (ref 0–0.5)
LYMPHOCYTES # BLD AUTO: 2.5 K/UL (ref 1–4.8)
LYMPHOCYTES NFR BLD: 42.1 % (ref 18–48)
MCH RBC QN AUTO: 29 PG (ref 27–31)
MCHC RBC AUTO-ENTMCNC: 31.9 G/DL (ref 32–36)
MCV RBC AUTO: 91 FL (ref 82–98)
MONOCYTES # BLD AUTO: 0.4 K/UL (ref 0.3–1)
MONOCYTES NFR BLD: 7.1 % (ref 4–15)
NEUTROPHILS # BLD AUTO: 2.9 K/UL (ref 1.8–7.7)
NEUTROPHILS NFR BLD: 49.1 % (ref 38–73)
NRBC BLD-RTO: 0 /100 WBC
PLATELET # BLD AUTO: 235 K/UL (ref 150–450)
PMV BLD AUTO: 9.6 FL (ref 9.2–12.9)
POCT GLUCOSE: 134 MG/DL (ref 70–110)
POCT GLUCOSE: 51 MG/DL (ref 70–110)
POTASSIUM SERPL-SCNC: 3.7 MMOL/L (ref 3.5–5.1)
PROT SERPL-MCNC: 6.4 G/DL (ref 6–8.4)
RBC # BLD AUTO: 3.93 M/UL (ref 4–5.4)
SODIUM SERPL-SCNC: 138 MMOL/L (ref 136–145)
WBC # BLD AUTO: 5.91 K/UL (ref 3.9–12.7)

## 2023-08-25 PROCEDURE — 99204 OFFICE O/P NEW MOD 45 MIN: CPT | Mod: ,,, | Performed by: INTERNAL MEDICINE

## 2023-08-25 PROCEDURE — 99900035 HC TECH TIME PER 15 MIN (STAT)

## 2023-08-25 PROCEDURE — 99204 PR OFFICE/OUTPT VISIT, NEW, LEVL IV, 45-59 MIN: ICD-10-PCS | Mod: ,,, | Performed by: INTERNAL MEDICINE

## 2023-08-25 PROCEDURE — 25000003 PHARM REV CODE 250: Performed by: HOSPITALIST

## 2023-08-25 PROCEDURE — 25000003 PHARM REV CODE 250: Performed by: NURSE PRACTITIONER

## 2023-08-25 PROCEDURE — 96376 TX/PRO/DX INJ SAME DRUG ADON: CPT | Mod: 59

## 2023-08-25 PROCEDURE — G0378 HOSPITAL OBSERVATION PER HR: HCPCS

## 2023-08-25 PROCEDURE — 80053 COMPREHEN METABOLIC PANEL: CPT | Performed by: HOSPITALIST

## 2023-08-25 PROCEDURE — 94761 N-INVAS EAR/PLS OXIMETRY MLT: CPT

## 2023-08-25 PROCEDURE — 25000003 PHARM REV CODE 250: Performed by: NURSE ANESTHETIST, CERTIFIED REGISTERED

## 2023-08-25 PROCEDURE — D9220A PRA ANESTHESIA: ICD-10-PCS | Mod: CRNA,,, | Performed by: NURSE ANESTHETIST, CERTIFIED REGISTERED

## 2023-08-25 PROCEDURE — D9220A PRA ANESTHESIA: Mod: ANES,,, | Performed by: ANESTHESIOLOGY

## 2023-08-25 PROCEDURE — 36415 COLL VENOUS BLD VENIPUNCTURE: CPT | Performed by: HOSPITALIST

## 2023-08-25 PROCEDURE — 43235 EGD DIAGNOSTIC BRUSH WASH: CPT | Mod: ,,, | Performed by: INTERNAL MEDICINE

## 2023-08-25 PROCEDURE — 37000008 HC ANESTHESIA 1ST 15 MINUTES: Performed by: INTERNAL MEDICINE

## 2023-08-25 PROCEDURE — 43235 EGD DIAGNOSTIC BRUSH WASH: CPT | Performed by: INTERNAL MEDICINE

## 2023-08-25 PROCEDURE — 96366 THER/PROPH/DIAG IV INF ADDON: CPT | Mod: 59

## 2023-08-25 PROCEDURE — 25000003 PHARM REV CODE 250: Performed by: INTERNAL MEDICINE

## 2023-08-25 PROCEDURE — D9220A PRA ANESTHESIA: ICD-10-PCS | Mod: ANES,,, | Performed by: ANESTHESIOLOGY

## 2023-08-25 PROCEDURE — C9113 INJ PANTOPRAZOLE SODIUM, VIA: HCPCS | Performed by: HOSPITALIST

## 2023-08-25 PROCEDURE — 63600175 PHARM REV CODE 636 W HCPCS: Performed by: NURSE ANESTHETIST, CERTIFIED REGISTERED

## 2023-08-25 PROCEDURE — 85025 COMPLETE CBC W/AUTO DIFF WBC: CPT | Performed by: HOSPITALIST

## 2023-08-25 PROCEDURE — 43235 PR EGD, FLEX, DIAGNOSTIC: ICD-10-PCS | Mod: ,,, | Performed by: INTERNAL MEDICINE

## 2023-08-25 PROCEDURE — 63600175 PHARM REV CODE 636 W HCPCS: Performed by: HOSPITALIST

## 2023-08-25 PROCEDURE — D9220A PRA ANESTHESIA: Mod: CRNA,,, | Performed by: NURSE ANESTHETIST, CERTIFIED REGISTERED

## 2023-08-25 RX ORDER — LIDOCAINE HYDROCHLORIDE 20 MG/ML
INJECTION INTRAVENOUS
Status: DISCONTINUED | OUTPATIENT
Start: 2023-08-25 | End: 2023-08-25

## 2023-08-25 RX ORDER — DIPHENHYDRAMINE HCL 25 MG
25 CAPSULE ORAL EVERY 6 HOURS PRN
Status: DISCONTINUED | OUTPATIENT
Start: 2023-08-25 | End: 2023-08-26 | Stop reason: HOSPADM

## 2023-08-25 RX ORDER — PROPOFOL 10 MG/ML
VIAL (ML) INTRAVENOUS
Status: DISCONTINUED | OUTPATIENT
Start: 2023-08-25 | End: 2023-08-25

## 2023-08-25 RX ORDER — SODIUM CHLORIDE 9 MG/ML
INJECTION, SOLUTION INTRAVENOUS CONTINUOUS
Status: DISCONTINUED | OUTPATIENT
Start: 2023-08-25 | End: 2023-08-26 | Stop reason: HOSPADM

## 2023-08-25 RX ADMIN — PANTOPRAZOLE SODIUM 8 MG/HR: 40 INJECTION, POWDER, FOR SOLUTION INTRAVENOUS at 03:08

## 2023-08-25 RX ADMIN — PANTOPRAZOLE SODIUM 8 MG/HR: 40 INJECTION, POWDER, FOR SOLUTION INTRAVENOUS at 05:08

## 2023-08-25 RX ADMIN — PANTOPRAZOLE SODIUM 8 MG/HR: 40 INJECTION, POWDER, FOR SOLUTION INTRAVENOUS at 08:08

## 2023-08-25 RX ADMIN — LIDOCAINE HYDROCHLORIDE 100 MG: 20 INJECTION, SOLUTION INTRAVENOUS at 02:08

## 2023-08-25 RX ADMIN — DIPHENHYDRAMINE HYDROCHLORIDE 25 MG: 25 CAPSULE ORAL at 11:08

## 2023-08-25 RX ADMIN — PANTOPRAZOLE SODIUM 8 MG/HR: 40 INJECTION, POWDER, FOR SOLUTION INTRAVENOUS at 02:08

## 2023-08-25 RX ADMIN — VENLAFAXINE HYDROCHLORIDE 150 MG: 150 CAPSULE, EXTENDED RELEASE ORAL at 09:08

## 2023-08-25 RX ADMIN — SODIUM CHLORIDE: 9 INJECTION, SOLUTION INTRAVENOUS at 01:08

## 2023-08-25 RX ADMIN — DEXTROSE MONOHYDRATE 125 ML: 100 INJECTION, SOLUTION INTRAVENOUS at 06:08

## 2023-08-25 RX ADMIN — BUSPIRONE HYDROCHLORIDE 5 MG: 5 TABLET ORAL at 09:08

## 2023-08-25 RX ADMIN — PROPOFOL 100 MG: 10 INJECTION, EMULSION INTRAVENOUS at 02:08

## 2023-08-25 RX ADMIN — PANTOPRAZOLE SODIUM 8 MG/HR: 40 INJECTION, POWDER, FOR SOLUTION INTRAVENOUS at 09:08

## 2023-08-25 RX ADMIN — PROPOFOL 150 MG: 10 INJECTION, EMULSION INTRAVENOUS at 02:08

## 2023-08-25 RX ADMIN — SODIUM CHLORIDE: 9 INJECTION, SOLUTION INTRAVENOUS at 06:08

## 2023-08-25 NOTE — PHARMACY MED REC
.    VIRTUAL TELENOTE      The patient location is: [facility]  Tt: no one   End time:  07:11    Total time spent with patient: 15 minutes    The attending portion of this evaluation, treatment, and documentation was performed per Carolann Porter via Telemedicine AudioVisual using the secure "[employer] software platform with 2 way audio/video. The provider was located off-site and the patient is located in the hospital. The aforementioned video software was utilized to document the relevant history and physical exam.

## 2023-08-25 NOTE — H&P
[facility] Medicine  History & Physical    Patient Name: Keirra Mccain  MRN: [id number]  Patient Class: OP- Observation  Admission Date: 8/24/2023  Attending Physician: Lissette Diane MD   Primary Care Provider: Litzy Barcenas MD         Patient information was obtained from patient, past medical records and ER records.     Subjective:     Principal Problem:<principal problem not specified>    Chief Complaint:   Chief Complaint   Patient presents with    Suicide Attempt     Pt brought to ED via EMS after drinking about a half cup of bleach in an attempt to commit suicide.          HPI: Kierra Mccain is a 27-year-old transgender male who presented to the emergency room for psychiatric evaluation following a suicide attempt.  Patient has a history of suicidal ideations with different plans involving cutting his wrists, jumping in front of a train or poisoning himself.  He reports he mixed 6 oz of bleach with milk and drank it in an attempt to commit suicide.  He states he vomited approximately 5 minutes later.  Patient states he has been experiencing increased stress and anxiety because of disagreements and altercations with his family members.  Patient denies abdominal pain and shortness a breath.  ED workup revealed CMP and CBC unremarkable.  Urine drug screen positive for THC.  ED provider spoke to poison control and discussed with GI on-call. PEC was ordered and patient was started on IV Protonix infusion. He was referred to Hospital Medicine.  Patient seen in the ED and reports experiencing suicidal ideation.  He denies auditory and visual hallucinations.  Patient also reports sore throat and denies hematemesis and hematochezia.  Patient will be admitted to Hospital Medicine for further evaluation and management.            Past Medical History:   Diagnosis Date    Anxiety 2015    Asthma     Bulging disc     Cannabis use disorder, mild, abuse 8/2/2019    History of  suicidal ideation     patient reports suicide attempt x 2    Panic disorder 2016    Protrusion of lumbar intervertebral disc        Past Surgical History:   Procedure Laterality Date    BILATERAL MASTECTOMY Bilateral 2/15/2021    Procedure: MASTECTOMY, BILATERAL WITH BILATERAL FREE NIPPLE GRAFTING;  Surgeon: Yadiel Landin MD;  Location: [facility];  Service: Plastics;  Laterality: Bilateral;    DE QUERVAIN'S RELEASE Right 2019    RECONSTRUCTION OF NIPPLE  2/15/2021    Procedure: RECONSTRUCTION, NIPPLE;  Surgeon: Yadiel Landin MD;  Location: [facility];  Service: Plastics;;       Review of patient's allergies indicates:  No Known Allergies    Current Facility-Administered Medications on File Prior to Encounter   Medication    LIDOcaine-EPINEPHrine 1%-1:100,000 20 mL, EPINEPHrine (PF) 1 mg in lactated Ringers 1,000 mL irrigation    [] triamcinolone acetonide injection 40 mg     Current Outpatient Medications on File Prior to Encounter   Medication Sig    acetaminophen (TYLENOL ORAL) Take 1 tablet by mouth as needed (pain).    busPIRone (BUSPAR) 10 MG tablet Take 5 mg by mouth 2 (two) times daily.    cyanocobalamin, vitamin B-12, (VITAMIN B-12 ORAL) Take 1 tablet by mouth once daily.    ondansetron (ZOFRAN-ODT) 4 MG TbDL Take 2 tablets (8 mg total) by mouth every 8 (eight) hours as needed (nausea, vomiting).    traZODone (DESYREL) 50 MG tablet Take 25-50 mg by mouth nightly as needed for Insomnia.    venlafaxine (EFFEXOR-XR) 150 MG Cp24 Take 150 mg by mouth 2 (two) times daily.    albuterol (PROAIR HFA) 90 mcg/actuation inhaler Inhale 2 puffs into the lungs every 6 (six) hours as needed for Wheezing. Rescue    oxyCODONE-acetaminophen (PERCOCET)  mg per tablet Take 1 tablet by mouth every 4 (four) hours as needed for Pain.    propranoloL (INDERAL) 20 MG tablet Take 20 mg by mouth as needed. anxiety    testosterone cypionate (DEPOTESTOTERONE CYPIONATE) 100 mg/mL injection Inject 100 mg  into the muscle once a week.     [DISCONTINUED] hydrOXYzine (ATARAX) 50 MG tablet Take 50 mg by mouth nightly as needed for Itching.    [DISCONTINUED] mirtazapine (REMERON) 15 MG tablet Take 1 tablet (15 mg total) by mouth nightly.    [DISCONTINUED] traZODone (DESYREL) 100 MG tablet Take  mg by mouth nightly as needed.    [DISCONTINUED] venlafaxine (EFFEXOR-XR) 75 MG 24 hr capsule Take 3 capsules (225 mg total) by mouth once daily.     Family History       Problem Relation (Age of Onset)    Heart disease Paternal Grandmother          Tobacco Use    Smoking status: Never    Smokeless tobacco: Never   Substance and Sexual Activity    Alcohol use: Yes     Comment: rare    Drug use: Yes     Types: Marijuana     Comment: occasional    Sexual activity: Not on file     Review of Systems   HENT:  Positive for sore throat.    Psychiatric/Behavioral:  Positive for self-injury and suicidal ideas.      Objective:     Vital Signs (Most Recent):  Temp: 97.9 °F (36.6 °C) (08/24/23 1702)  Pulse: 105 (08/24/23 1902)  Resp: 14 (08/24/23 1902)  BP: (!) 109/57 (08/24/23 1902)  SpO2: 99 % (08/24/23 1902) Vital Signs (24h Range):  Temp:  [97.9 °F (36.6 °C)-98.7 °F (37.1 °C)] 97.9 °F (36.6 °C)  Pulse:  [] 105  Resp:  [14-19] 14  SpO2:  [96 %-100 %] 99 %  BP: (109-128)/(57-85) 109/57     Weight: 63.5 kg (140 lb)  Body mass index is 25.61 kg/m².     Physical Exam  Vitals reviewed.   Constitutional:       Appearance: Normal appearance. She is normal weight.   HENT:      Head: Normocephalic.      Mouth/Throat:      Mouth: Mucous membranes are moist.      Pharynx: Oropharynx is clear.   Eyes:      Pupils: Pupils are equal, round, and reactive to light.   Cardiovascular:      Rate and Rhythm: Normal rate.      Pulses: Normal pulses.   Pulmonary:      Effort: Pulmonary effort is normal.      Breath sounds: Normal breath sounds.   Abdominal:      General: Bowel sounds are normal.   Musculoskeletal:         General: Normal  range of motion.      Cervical back: Normal range of motion.   Skin:     General: Skin is warm and dry.   Neurological:      Mental Status: She is alert and oriented to person, place, and time. Mental status is at baseline.   Psychiatric:         Attention and Perception: Attention normal.         Mood and Affect: Mood is depressed.         Speech: Speech normal.         Behavior: Behavior is cooperative.              CRANIAL NERVES     CN III, IV, VI   Pupils are equal, round, and reactive to light.       Significant Labs: All pertinent labs within the past 24 hours have been reviewed.  CBC:   Recent Labs   Lab 08/24/23  1614   WBC 8.15   HGB 13.7   HCT 39.3        CMP:   Recent Labs   Lab 08/24/23  1614      K 3.6      CO2 20*   GLU 82   BUN 10   CREATININE 0.8   CALCIUM 9.7   PROT 7.7   ALBUMIN 4.5   BILITOT 0.8   ALKPHOS 54*   AST 17   ALT 17   ANIONGAP 15       Significant Imaging: I have reviewed all pertinent imaging results/findings within the past 24 hours.  Imaging Results    None           Assessment/Plan:     Suicidal behavior with attempted self-injury  Hx of depression with attempt by ingesting bleach    -Protonix IV for bleach ingestion  -consult psych, pending recommendations  -Consult GI for bleach ingestion        Major depressive disorder, recurrent severe without psychotic features  Patient has persistent depression which is severe and is currently uncontrolled. Will Continue anti-depressant medications. We will consult psychiatry at this time. Patient does not display psychosis at this time. Continue to monitor closely and adjust plan of care as needed.    -PEC   -sitter at bedside    Mild intermittent asthma without complication  Noted. Asthma well controlled    -PRN duo nebs      NAEEM (generalized anxiety disorder), lifelong  History noted    -Continue home medications  -Psych consult pending        VTE Risk Mitigation (From admission, onward)    Scar Ambrose  MIRELA Steinberg NP  Department of Hospital Medicine  [facility]

## 2023-08-25 NOTE — NURSING
"Noy Parks NP is contacted at [phone]. Provider notified of reason for admission and the drinking of bleach. Provider notified that pt. Is requesting "Effexor." Provider overlooks chart and orders noted.   " 0

## 2023-08-25 NOTE — CONSULTS
[facility]  Psychiatry  Telepsychiatry Consult Note    Please see previous notes:    Patient agreeable to consultation via telepsychiatry.    Tele-Consultation from Psychiatry started: 8/24/2023 at 0855  The chief complaint leading to psychiatric consultation is: SI  This consultation was requested by attending physician.  The location of the consulting psychiatrist is  [place] .  The patient location is  [facility] MEDICAL SURGICAL [facility]*    Also present with the patient at the time of the consultation: rn    Patient Identification:   Kierra Mccain is a 27 y.o. female.    Patient information was obtained from patient and primary team.  Patient presented voluntarily to the Emergency Department by ambulance where the patient received see Ambulance Run Sheet prior to arrival.    Consults  Teleconsult Time Documentation  Subjective:     History of Present Illness:  No notes on file Kierra Mccain is a 27-year-old transgender male who presented to the emergency room for psychiatric evaluation following a suicide attempt.  Patient has a history of suicidal ideations with different plans involving cutting his wrists, jumping in front of a train or poisoning himself.  He reports he mixed 6 oz of bleach with milk and drank it in an attempt to commit suicide.  He states he vomited approximately 5 minutes later.  Patient states he has been experiencing increased stress and anxiety because of disagreements and altercations with his family members.    Psychiatric History:   Previous Psychiatric Hospitalizations: No   Previous Medication Trials: Yes   Previous Suicide Attempts: yes   History of Violence: no  History of Depression: yes  History of Jacquelyn: no  History of Auditory/Visual Hallucination no  History of Delusions: no  Outpatient psychiatrist (current & past): Yes    Substance Abuse History:  Tobacco:No  Alcohol: No  Illicit Substances:No  Detox/Rehab: No    Legal History: Past charges/incarcerations: No  "    Family Psychiatric History: denies      Social History:  Developmental/Childhood:Achieved all developmental milestones timely  *Education:High School Diploma  Employment Status/Finances:Employed   Relationship Status/Sexual Orientation: Single:  Relationship strained  Children: 0  Housing Status: Home    history:  NO  Access to gun: NO  Voodoo:Spiritual without formal affiliation  Recreational activities:Time with family    Psychiatric Mental Status Exam:  Arousal: alert  Sensorium/Orientation: oriented to grossly intact  Behavior/Cooperation: normal, cooperative   Speech: normal tone, normal rate, normal pitch, normal volume  Language: grossly intact  Mood: " ok "   Affect: appropriate  Thought Process: normal and logical  Thought Content:   Auditory hallucinations: NO  Visual hallucinations: NO  Paranoia: NO  Delusions:  NO  Suicidal ideation: YES:      Homicidal ideation: NO  Attention/Concentration:  intact  Memory:    Recent:  Intact   Remote: Intact   3/3 immediate, 3/3 at 5 min  Fund of Knowledge: Intact   Abstract reasoning: proverbs were abstract  Insight: poor awareness of illness  Judgment: behavior is adequate to circumstances      Past Medical History:   Past Medical History:   Diagnosis Date    Anxiety 2015    Asthma     Bulging disc     Cannabis use disorder, mild, abuse 8/2/2019    History of suicidal ideation     patient reports suicide attempt x 2    Panic disorder 2016    Protrusion of lumbar intervertebral disc       Laboratory Data:   Labs Reviewed   COMPREHENSIVE METABOLIC PANEL - Abnormal; Notable for the following components:       Result Value    CO2 20 (*)     Alkaline Phosphatase 54 (*)     All other components within normal limits   URINALYSIS, REFLEX TO URINE CULTURE - Abnormal; Notable for the following components:    Specific Gravity, UA >1.030 (*)     Protein, UA 1+ (*)     Ketones, UA 2+ (*)     Urobilinogen, UA 2.0-3.0 (*)     Leukocytes, UA 2+ (*)     All other " components within normal limits    Narrative:     Specimen Source->Urine   DRUG SCREEN PANEL, URINE EMERGENCY - Abnormal; Notable for the following components:    THC Presumptive Positive (*)     Creatinine, Urine 423.1 (*)     All other components within normal limits    Narrative:     Specimen Source->Urine   ACETAMINOPHEN LEVEL - Abnormal; Notable for the following components:    Acetaminophen (Tylenol), Serum <3.0 (*)     All other components within normal limits   URINALYSIS MICROSCOPIC - Abnormal; Notable for the following components:    WBC, UA 7 (*)     Yeast, UA Rare (*)     All other components within normal limits    Narrative:     Specimen Source->Urine   CBC W/ AUTO DIFFERENTIAL   TSH   ALCOHOL,MEDICAL (ETHANOL)       Neurological History:  Seizures: No  Head trauma: No    Allergies:   Review of patient's allergies indicates:  No Known Allergies    Medications in ER:   Medications   busPIRone tablet 5 mg (has no administration in time range)   sodium chloride 0.9% flush 10 mL (has no administration in time range)   albuterol-ipratropium 2.5 mg-0.5 mg/3 mL nebulizer solution 3 mL (has no administration in time range)   melatonin tablet 6 mg (has no administration in time range)   acetaminophen tablet 650 mg (has no administration in time range)   aluminum-magnesium hydroxide-simethicone 200-200-20 mg/5 mL suspension 30 mL (has no administration in time range)   naloxone 0.4 mg/mL injection 0.02 mg (has no administration in time range)   glucose chewable tablet 16 g (has no administration in time range)   glucose chewable tablet 24 g (has no administration in time range)   glucagon (human recombinant) injection 1 mg (has no administration in time range)   0.9%  NaCl infusion (has no administration in time range)   dextrose 10% bolus 125 mL 125 mL (has no administration in time range)   dextrose 10% bolus 250 mL 250 mL (has no administration in time range)   traZODone tablet 100 mg (has no administration  in time range)   pantoprazole (PROTONIX) 40 mg in sodium chloride 0.9 % 100 mL IVPB (MB+) (has no administration in time range)   pantoprazole injection 40 mg (40 mg Intravenous Given 8/24/23 1736)   pantoprazole (PROTONIX) 40 mg in sodium chloride 0.9 % 100 mL IVPB (MB+) (8 mg/hr Intravenous New Bag 8/24/23 1741)       Medications at home: buspar, effexer, trazodone    No new subjective & objective note has been filed under this hospital service since the last note was generated.      Assessment - Diagnosis - Goals:     Diagnosis/Impression: unspecified depressive disorder    Rec: pec and inpt placement - SA via OD on bleach, increasing SI with plan over the past month due to family stressors at home, pt states meds no longer helpful.      Time with patient: 20 min      More than 50% of the time was spent counseling/coordinating care    Consulting clinician was informed of the encounter and consult note.    Consultation ended: 8/24/2023 at 2737    Kalpesh Gastelum MD   Psychiatry  [facility]

## 2023-08-25 NOTE — HOSPITAL COURSE
27-year-old transgender male who was observed by hospital medicine service for psychiatric evaluation following a suicide attempt.  Attempted suicide by bleach ingestion.  GI was consulted and EGD was planned.  Patient was held under pec.

## 2023-08-25 NOTE — ASSESSMENT & PLAN NOTE
Hx of depression with attempt by ingesting bleach    -Protonix IV for bleach ingestion  -consult psych, appreciate recommendations  -Consult GI for bleach ingestion - EGD today

## 2023-08-25 NOTE — SUBJECTIVE & OBJECTIVE
Interval History:  Patient seen and examined.  Complains of nausea but otherwise no complaints.  Plan for EGD this afternoon.  Remains PEC'd.  Tele psych consult appreciated.    Review of Systems   Respiratory:  Negative for shortness of breath.    Cardiovascular:  Negative for chest pain.   Gastrointestinal:  Positive for nausea. Negative for abdominal pain and vomiting.   Psychiatric/Behavioral:  Positive for suicidal ideas.      Objective:     Vital Signs (Most Recent):  Temp: 97.7 °F (36.5 °C) (08/25/23 1315)  Pulse: 85 (08/25/23 1315)  Resp: 16 (08/25/23 1315)  BP: 126/74 (08/25/23 1315)  SpO2: 98 % (08/25/23 1315) Vital Signs (24h Range):  Temp:  [97.4 °F (36.3 °C)-99.1 °F (37.3 °C)] 97.7 °F (36.5 °C)  Pulse:  [] 85  Resp:  [14-19] 16  SpO2:  [96 %-100 %] 98 %  BP: (101-130)/(53-85) 126/74     Weight: 61.6 kg (135 lb 12.9 oz)  Body mass index is 24.84 kg/m².    Intake/Output Summary (Last 24 hours) at 8/25/2023 1401  Last data filed at 8/25/2023 0621  Gross per 24 hour   Intake 1542.65 ml   Output --   Net 1542.65 ml         Physical Exam  Constitutional:       General: She is not in acute distress.     Appearance: She is well-developed.   HENT:      Head: Normocephalic and atraumatic.   Eyes:      Pupils: Pupils are equal, round, and reactive to light.   Cardiovascular:      Rate and Rhythm: Normal rate and regular rhythm.      Heart sounds: No murmur heard.  Pulmonary:      Effort: Pulmonary effort is normal. No respiratory distress.      Breath sounds: Normal breath sounds. No wheezing or rales.   Abdominal:      General: Bowel sounds are normal. There is no distension.      Palpations: Abdomen is soft.      Tenderness: There is no abdominal tenderness.   Musculoskeletal:         General: Normal range of motion.   Skin:     General: Skin is warm and dry.      Findings: No rash.   Neurological:      Mental Status: She is alert and oriented to person, place, and time.      Cranial Nerves: No cranial  nerve deficit.   Psychiatric:      Comments: Flat affect             Significant Labs: All pertinent labs within the past 24 hours have been reviewed.    Significant Imaging: I have reviewed all pertinent imaging results/findings within the past 24 hours.

## 2023-08-25 NOTE — NURSING
Awake alert and oriented x4. Good historian. Reviewed plan of care and room assignment. No distress noted or voiced. 20g to lt a/c with site free of s/s of infiltration. Questions answered and encouraged.

## 2023-08-25 NOTE — NURSING
Psych consult done via camera in patient room. Consult noted to being at approximately 2016 and end at approximately 2125.

## 2023-08-25 NOTE — PLAN OF CARE
Problem: Violence Risk or Actual  Goal: Anger and Impulse Control  8/25/2023 0510 by Saurabh Schaeffer RN  Outcome: Ongoing, Progressing  8/25/2023 0509 by Saurabh Schaeffer RN  Outcome: Ongoing, Progressing     Problem: Adult Inpatient Plan of Care  Goal: Plan of Care Review  8/25/2023 0510 by Saurabh Schaeffer RN  Outcome: Ongoing, Progressing  8/25/2023 0509 by Saurabh Schaeffer RN  Outcome: Ongoing, Progressing  Goal: Patient-Specific Goal (Individualized)  8/25/2023 0510 by Saurabh Schaeffer RN  Outcome: Ongoing, Progressing  8/25/2023 0509 by Saurabh Schaeffer RN  Outcome: Ongoing, Progressing     Problem: Infection  Goal: Absence of Infection Signs and Symptoms  8/25/2023 0510 by Saurabh Schaeffer RN  Outcome: Ongoing, Progressing  8/25/2023 0509 by Saurabh Schaeffer RN  Outcome: Ongoing, Progressing     Patient admits to floor this shift and shows overall calm and cooperative behavior throughout admission process. Mild flat affect evident but pt. Is noted to smile at times. Patient answers questions appropriately and displays acceptance of education offered. Safety precautions in place as ordered. Sitter present in room AAT this shift. Patient ambulatory to restroom this shift. Denies pain this shift and/or is free from voicing any other major concerns. No signs of infection noted this shift and no falls encountered this shift. POC education offered with patient offering appropriate response. Psych consult took place this shift. Low blood sugar encountered in the morning, asymptomatic, and treated.

## 2023-08-25 NOTE — PLAN OF CARE
[facility] - Endoscopy  Initial Discharge Assessment       Primary Care Provider: Litzy Barcenas MD    Admission Diagnosis: Ingestion of bleach, intentional self-harm, initial encounter [T54.92XA]    Admission Date: 8/24/2023  Expected Discharge Date:     Transition of Care Barriers: None    Payor: MEDICAID / Plan: AETNA [facility] / Product Type: Managed Medicaid /     Extended Emergency Contact Information  Primary Emergency Contact: Fazal Orourke  Address: [address] [place] [address]           [address]           [place] [address] [facility]  Home Phone: [phone]  Mobile Phone: [phone]  Relation: Father  Preferred language: English   needed? No    Discharge Plan A: Psychiatric hospital  Discharge Plan B: Home with family      [id number] - [place], [place] - [address] & [facility]  [address] [facility] [address]  Phone: [phone] Fax: [phone]      DC assessment completed with patient at bedside. Verified information on facesheet as correct. Denies POA. NOK is father. Lives at listed address with father. PCP is Dr. Barcenas- last seen about a year ago. Does outpt mental health therapy at [facility] every other week.  Denies hh/hd/dme/blood thinners. Independent at baseline. Drives himself to apts. Verified insurance on file. Denies recent inpt stay in last 30 days. Reports taking home medications as prescribed and can currently afford them. DC plan is psych facility- we discussed PEC process and cannot guarantee placement in facility of choice but can let transfer center know preference... states if possible wants [facility]        Initial Assessment (most recent)       Adult Discharge Assessment - 08/25/23 7255          Discharge Assessment    Assessment Type Discharge Planning Assessment     Confirmed/corrected address, phone number and insurance Yes     Confirmed Demographics Correct on  Facesheet     Source of Information patient     Does patient/caregiver understand observation status Yes     Communicated RAGINI with patient/caregiver Yes     Reason For Admission SA     People in Home parent(s)     Facility Arrived From: ER     Do you expect to return to your current living situation? Yes     Do you have help at home or someone to help you manage your care at home? Yes     Prior to hospitilization cognitive status: Alert/Oriented     Current cognitive status: Alert/Oriented     Equipment Currently Used at Home none     Readmission within 30 days? No     Patient currently being followed by outpatient case management? No     Do you currently have service(s) that help you manage your care at home? No     Do you take prescription medications? Yes     Do you have prescription coverage? Yes     Do you have any problems affording any of your prescribed medications? No     Is the patient taking medications as prescribed? yes     Who is going to help you get home at discharge? father     How do you get to doctors appointments? car, drives self     Are you on dialysis? No     Do you take coumadin? No     DME Needed Upon Discharge  none     Discharge Plan discussed with: Patient     Transition of Care Barriers None     Discharge Plan A Psychiatric hospital     Discharge Plan B Home with family

## 2023-08-25 NOTE — SUBJECTIVE & OBJECTIVE
Past Medical History:   Diagnosis Date    Anxiety 2015    Asthma     Bulging disc     Cannabis use disorder, mild, abuse 2019    History of suicidal ideation     patient reports suicide attempt x 2    Panic disorder 2016    Protrusion of lumbar intervertebral disc        Past Surgical History:   Procedure Laterality Date    BILATERAL MASTECTOMY Bilateral 2/15/2021    Procedure: MASTECTOMY, BILATERAL WITH BILATERAL FREE NIPPLE GRAFTING;  Surgeon: Yadiel Landin MD;  Location: [facility] OR;  Service: Plastics;  Laterality: Bilateral;    DE QUERVAIN'S RELEASE Right 2019    RECONSTRUCTION OF NIPPLE  2/15/2021    Procedure: RECONSTRUCTION, NIPPLE;  Surgeon: Yadiel Landin MD;  Location: [facility];  Service: Plastics;;       Review of patient's allergies indicates:  No Known Allergies    Current Facility-Administered Medications on File Prior to Encounter   Medication    LIDOcaine-EPINEPHrine 1%-1:100,000 20 mL, EPINEPHrine (PF) 1 mg in lactated Ringers 1,000 mL irrigation    [] triamcinolone acetonide injection 40 mg     Current Outpatient Medications on File Prior to Encounter   Medication Sig    acetaminophen (TYLENOL ORAL) Take 1 tablet by mouth as needed (pain).    busPIRone (BUSPAR) 10 MG tablet Take 5 mg by mouth 2 (two) times daily.    cyanocobalamin, vitamin B-12, (VITAMIN B-12 ORAL) Take 1 tablet by mouth once daily.    ondansetron (ZOFRAN-ODT) 4 MG TbDL Take 2 tablets (8 mg total) by mouth every 8 (eight) hours as needed (nausea, vomiting).    traZODone (DESYREL) 50 MG tablet Take 25-50 mg by mouth nightly as needed for Insomnia.    venlafaxine (EFFEXOR-XR) 150 MG Cp24 Take 150 mg by mouth 2 (two) times daily.    albuterol (PROAIR HFA) 90 mcg/actuation inhaler Inhale 2 puffs into the lungs every 6 (six) hours as needed for Wheezing. Rescue    oxyCODONE-acetaminophen (PERCOCET)  mg per tablet Take 1 tablet by mouth every 4 (four) hours as needed for Pain.    propranoloL (INDERAL) 20 MG tablet  Take 20 mg by mouth as needed. anxiety    testosterone cypionate (DEPOTESTOTERONE CYPIONATE) 100 mg/mL injection Inject 100 mg into the muscle once a week.     [DISCONTINUED] hydrOXYzine (ATARAX) 50 MG tablet Take 50 mg by mouth nightly as needed for Itching.    [DISCONTINUED] mirtazapine (REMERON) 15 MG tablet Take 1 tablet (15 mg total) by mouth nightly.    [DISCONTINUED] traZODone (DESYREL) 100 MG tablet Take  mg by mouth nightly as needed.    [DISCONTINUED] venlafaxine (EFFEXOR-XR) 75 MG 24 hr capsule Take 3 capsules (225 mg total) by mouth once daily.     Family History       Problem Relation (Age of Onset)    Heart disease Paternal Grandmother          Tobacco Use    Smoking status: Never    Smokeless tobacco: Never   Substance and Sexual Activity    Alcohol use: Yes     Comment: rare    Drug use: Yes     Types: Marijuana     Comment: occasional    Sexual activity: Not on file     Review of Systems   HENT:  Positive for sore throat.    Psychiatric/Behavioral:  Positive for self-injury and suicidal ideas.      Objective:     Vital Signs (Most Recent):  Temp: 97.9 °F (36.6 °C) (08/24/23 1702)  Pulse: 105 (08/24/23 1902)  Resp: 14 (08/24/23 1902)  BP: (!) 109/57 (08/24/23 1902)  SpO2: 99 % (08/24/23 1902) Vital Signs (24h Range):  Temp:  [97.9 °F (36.6 °C)-98.7 °F (37.1 °C)] 97.9 °F (36.6 °C)  Pulse:  [] 105  Resp:  [14-19] 14  SpO2:  [96 %-100 %] 99 %  BP: (109-128)/(57-85) 109/57     Weight: 63.5 kg (140 lb)  Body mass index is 25.61 kg/m².     Physical Exam  Vitals reviewed.   Constitutional:       Appearance: Normal appearance. She is normal weight.   HENT:      Head: Normocephalic.      Mouth/Throat:      Mouth: Mucous membranes are moist.      Pharynx: Oropharynx is clear.   Eyes:      Pupils: Pupils are equal, round, and reactive to light.   Cardiovascular:      Rate and Rhythm: Normal rate.      Pulses: Normal pulses.   Pulmonary:      Effort: Pulmonary effort is normal.      Breath sounds:  Normal breath sounds.   Abdominal:      General: Bowel sounds are normal.   Musculoskeletal:         General: Normal range of motion.      Cervical back: Normal range of motion.   Skin:     General: Skin is warm and dry.   Neurological:      Mental Status: She is alert and oriented to person, place, and time. Mental status is at baseline.   Psychiatric:         Attention and Perception: Attention normal.         Mood and Affect: Mood is depressed.         Speech: Speech normal.         Behavior: Behavior is cooperative.              CRANIAL NERVES     CN III, IV, VI   Pupils are equal, round, and reactive to light.       Significant Labs: All pertinent labs within the past 24 hours have been reviewed.  CBC:   Recent Labs   Lab 08/24/23  1614   WBC 8.15   HGB 13.7   HCT 39.3        CMP:   Recent Labs   Lab 08/24/23  1614      K 3.6      CO2 20*   GLU 82   BUN 10   CREATININE 0.8   CALCIUM 9.7   PROT 7.7   ALBUMIN 4.5   BILITOT 0.8   ALKPHOS 54*   AST 17   ALT 17   ANIONGAP 15       Significant Imaging: I have reviewed all pertinent imaging results/findings within the past 24 hours.  Imaging Results    None

## 2023-08-25 NOTE — PROVATION PATIENT INSTRUCTIONS
Discharge Summary/Instructions after an Endoscopic Procedure  Patient Name: Kierra Mccain  Patient MRN: [id number]  Patient YOB: 1995 Friday, August 25, 2023  Jenaro Burkett MD  Dear patient,  As a result of recent federal legislation (The Federal Cures Act), you may   receive lab or pathology results from your procedure in your [facility]   account before your physician is able to contact you. Your physician or   their representative will relay the results to you with their   recommendations at their soonest availability.  Thank you,  RESTRICTIONS:  During your procedure today, you received medications for sedation.  These   medications may affect your judgment, balance and coordination.  Therefore,   for 24 hours, you have the following restrictions:   - DO NOT drive a car, operate machinery, make legal/financial decisions,   sign important papers or drink alcohol.    ACTIVITY:  Today: no heavy lifting, straining or running due to procedural   sedation/anesthesia.  The following day: return to full activity including work.  DIET:  Eat and drink normally unless instructed otherwise.     TREATMENT FOR COMMON SIDE EFFECTS:  - Mild abdominal pain, nausea, belching, bloating or excessive gas:  rest,   eat lightly and use a heating pad.  - Sore Throat: treat with throat lozenges and/or gargle with warm salt   water.  - Because air was used during the procedure, expelling large amounts of air   from your rectum or belching is normal.  - If a bowel prep was taken, you may not have a bowel movement for 1-3 days.    This is normal.  SYMPTOMS TO WATCH FOR AND REPORT TO YOUR PHYSICIAN:  1. Abdominal pain or bloating, other than gas cramps.  2. Chest pain.  3. Back pain.  4. Signs of infection such as: chills or fever occurring within 24 hours   after the procedure.  5. Rectal bleeding, which would show as bright red, maroon, or black stools.   (A tablespoon of blood from the rectum is not serious, especially if    hemorrhoids are present.)  6. Vomiting.  7. Weakness or dizziness.  GO DIRECTLY TO THE NEAREST EMERGENCY ROOM IF YOU HAVE ANY OF THE FOLLOWING:      Difficulty breathing              Chills and/or fever over 101 F   Persistent vomiting and/or vomiting blood   Severe abdominal pain   Severe chest pain   Black, tarry stools   Bleeding- more than one tablespoon   Any other symptom or condition that you feel may need urgent attention  Your doctor recommends these additional instructions:  If any biopsies were taken, your doctors clinic will contact you in 1 to 2   weeks with any results.  - Resume previous diet.   - Continue present medications.   - No aspirin, ibuprofen, naproxen, or other non-steroidal anti-inflammatory   drugs.   - Repeat upper endoscopy PRN for surveillance.   - Follow an antireflux regimen.   - Return to GI office after studies are complete.   - Return patient to [facility] for ongoing care.  For questions, problems or results please call your physician - Jenaro Burkett MD at Work:  [phone].  [facility], EMERGENCY ROOM PHONE NUMBER: [phone]  IF A COMPLICATION OR EMERGENCY SITUATION ARISES AND YOU ARE UNABLE TO REACH   YOUR PHYSICIAN - GO DIRECTLY TO THE EMERGENCY ROOM.  Jenaro Burkett MD  8/25/2023 2:33:48 PM  This report has been verified and signed electronically.  Dear patient,  As a result of recent federal legislation (The Federal Cures Act), you may   receive lab or pathology results from your procedure in your [facility]   account before your physician is able to contact you. Your physician or   their representative will relay the results to you with their   recommendations at their soonest availability.  Thank you,  PROVATION

## 2023-08-25 NOTE — CONSULTS
[facility]     CC: Caustic ingestion    HPI 27 y.o. female transgender male who presents emergency department for psychiatric evaluation of suicide attempt.  Patient has had prior so it side attempt in the past and has been recently thinking about suicide with different plan such as jumping in front of a train or poisoning himself or cutting his wrists.  Today he mixed 6 oz of bleach mixed with milk and drank it in a suicide attempt approximately 5 minutes later he vomited.  He denies any abdominal pain or breathing difficulties but does state his throat is sore.  Patient states he has been under a lot of anxiety and stress because of disagreements and altercation with his family members.  He feels they are getting up on him and taking sites with each other.    FURTHER HISTORY:  Above obtained from independent review of records from admitting provider as well as from direct discussion with ED physician who states that patient does not appear toxic.  In addition, on my interview, I note the following:  Patient had toxic ingestion, bleach with milk, 6 oz volume, associated with some N/V but with no ongoing abdominal pain.        Past Medical History:   Diagnosis Date    Anxiety 2015    Asthma     Bulging disc     Cannabis use disorder, mild, abuse 8/2/2019    History of suicidal ideation     patient reports suicide attempt x 2    Panic disorder 2016    Protrusion of lumbar intervertebral disc        Past Surgical History:   Procedure Laterality Date    BILATERAL MASTECTOMY Bilateral 2/15/2021    Procedure: MASTECTOMY, BILATERAL WITH BILATERAL FREE NIPPLE GRAFTING;  Surgeon: Yadiel Landin MD;  Location: [facility];  Service: Plastics;  Laterality: Bilateral;    DE QUERVAIN'S RELEASE Right 07/11/2019    RECONSTRUCTION OF NIPPLE  2/15/2021    Procedure: RECONSTRUCTION, NIPPLE;  Surgeon: Yadiel Landin MD;  Location: [facility];  Service: Plastics;;       Social History     Tobacco Use    Smoking status:  "Never    Smokeless tobacco: Never   Substance Use Topics    Alcohol use: Yes     Comment: Pt. states, "socially."    Drug use: Yes     Types: Marijuana     Comment: occasional       Family History   Problem Relation Age of Onset    Heart disease Paternal Grandmother        Review of Systems  General ROS: negative for - chills, fever or weight loss  Psychological ROS: negative for - hallucination, depression or suicidal ideation  Ophthalmic ROS: negative for - blurry vision, photophobia or eye pain  ENT ROS: negative for - epistaxis, sore throat or rhinorrhea  Respiratory ROS: no cough, shortness of breath, or wheezing  Cardiovascular ROS: no chest pain or dyspnea on exertion  Gastrointestinal ROS: as above  Genito-Urinary ROS: no dysuria, trouble voiding, or hematuria  Musculoskeletal ROS: negative for - arthralgia, myalgia, weakness  Neurological ROS: no syncope or seizures; no ataxia  Dermatological ROS: negative for pruritis, rash and jaundice    Physical Examination  /74 (BP Location: Right arm, Patient Position: Lying)   Pulse 85   Temp 97.7 °F (36.5 °C) (Skin)   Resp 16   Ht 5' 2" (1.575 m)   Wt 61.6 kg (135 lb 12.9 oz)   SpO2 98%   Breastfeeding No   BMI 24.84 kg/m²   General appearance: alert, cooperative, no distress  HENT: Normocephalic, atraumatic, neck symmetrical, no nasal discharge   Eyes: conjunctivae/corneas clear, PERRL, EOM's intact, sclera anicteric  Lungs: clear to auscultation bilaterally, no dullness to percussion bilaterally, symmetric expansion, breathing unlabored  Heart: regular rate and rhythm without rub; no displacement of the PMI   Abdomen: soft, NT  Extremities: extremities symmetric; no clubbing, cyanosis, or edema  Integument: Skin color, texture, turgor normal; no rashes; hair distrubution normal, no jaundice  Neurologic: Alert and oriented X 3, no focal sensory or motor neurologic deficits  Psychiatric: no pressured speech; mildly blunted affect; no evidence of " impaired cognition, + anxiety/depression     Labs:  Lab Results   Component Value Date    WBC 5.91 08/25/2023    HGB 11.4 (L) 08/25/2023    HCT 35.7 (L) 08/25/2023    MCV 91 08/25/2023     08/25/2023         CMP  Sodium   Date Value Ref Range Status   08/25/2023 138 136 - 145 mmol/L Final   07/05/2019 138 134 - 144 mmol/L      Potassium   Date Value Ref Range Status   08/25/2023 3.7 3.5 - 5.1 mmol/L Final     Chloride   Date Value Ref Range Status   08/25/2023 107 95 - 110 mmol/L Final   07/05/2019 102 98 - 110 mmol/L      CO2   Date Value Ref Range Status   08/25/2023 18 (L) 23 - 29 mmol/L Final     Glucose   Date Value Ref Range Status   08/25/2023 54 (LL) 70 - 110 mg/dL Final     Comment:     glucose  critical result(s) called and verbal readback obtained from   [employer] crews by [email or web address] 08/25/2023 05:47     07/05/2019 83 70 - 99 mg/dL      BUN   Date Value Ref Range Status   08/25/2023 8 6 - 20 mg/dL Final     Creatinine   Date Value Ref Range Status   08/25/2023 0.7 0.5 - 1.4 mg/dL Final   07/05/2019 0.78 0.60 - 1.40 mg/dL      Calcium   Date Value Ref Range Status   08/25/2023 8.8 8.7 - 10.5 mg/dL Final     Total Protein   Date Value Ref Range Status   08/25/2023 6.4 6.0 - 8.4 g/dL Final     Albumin   Date Value Ref Range Status   08/25/2023 3.7 3.5 - 5.2 g/dL Final   07/05/2019 4.7 3.1 - 4.7 g/dL      Total Bilirubin   Date Value Ref Range Status   08/25/2023 1.1 (H) 0.1 - 1.0 mg/dL Final     Comment:     For infants and newborns, interpretation of results should be based  on gestational age, weight and in agreement with clinical  observations.    Premature Infant recommended reference ranges:  Up to 24 hours.............<8.0 mg/dL  Up to 48 hours............<12.0 mg/dL  3-5 days..................<15.0 mg/dL  6-29 days.................<15.0 mg/dL       Alkaline Phosphatase   Date Value Ref Range Status   08/25/2023 48 (L) 55 - 135 U/L Final     AST   Date Value Ref Range Status   08/25/2023 15 10 - 40 U/L  Final     ALT   Date Value Ref Range Status   08/25/2023 14 10 - 44 U/L Final     Anion Gap   Date Value Ref Range Status   08/25/2023 13 8 - 16 mmol/L Final     eGFR   Date Value Ref Range Status   08/25/2023 >60 >60 mL/min/1.73 m^2 Final     Case discussed as above    Assessment:   Caustic ingestion  Anemia  Suicide attempt    Plan:  PPI  EGD  Further recommendations to follow after above.  Communication will be sent to the referring provider regarding my assessment and plan on this patient via EPIC.    Jenaro Burkett MD  [facility]  [address], [address]  [place] [address]  Office: [phone]  Fax: [phone]

## 2023-08-25 NOTE — ASSESSMENT & PLAN NOTE
Hx of depression with attempt by ingesting bleach    -Protonix IV for bleach ingestion  -consult psych, pending recommendations  -Consult GI for bleach ingestion

## 2023-08-25 NOTE — NURSING
Pt arrived via w/c with sitter at side. PEC signed at 1842 (faxed to  and scanned to [facility] by ER). Pt A/O with flat affect, but cooperative. VSS-slight temp 99.1. Wallets, credit cards, I-Phone  retrieved and secured by .Rights/Responsibilities explained and Signed by patient. Room sweep completed by primary and charge nurses.   Safety maintained, sitter at bedside

## 2023-08-25 NOTE — NURSING
Nurses Note -- 4 Eyes      8/25/2023   12:05 AM      Skin assessed during: Admit      [x] No Altered Skin Integrity Present    []Prevention Measures Documented      [] Yes- Altered Skin Integrity Present or Discovered   [] LDA Added if Not in Epic (Describe Wound)   [] New Altered Skin Integrity was Present on Admit and Documented in LDA   [] Wound Image Taken    Wound Care Consulted? No    Attending Nurse:  Saurabh Schaeffer RN     Second RN/Staff Member:   Sandra Royal LPN

## 2023-08-25 NOTE — PROGRESS NOTES
[facility]  Progress Note    Patient Name: Kierra Mccain  MRN: [id number]  Patient Class: OP- Observation   Admission Date: 8/24/2023  Length of Stay: 0 days  Attending Physician: Lissette Diane MD  Primary Care Provider: Litzy Barcenas MD        Subjective:     Principal Problem:<principal problem not specified>        HPI:  Kierra Mccain is a 27-year-old transgender male who presented to the emergency room for psychiatric evaluation following a suicide attempt.  Patient has a history of suicidal ideations with different plans involving cutting his wrists, jumping in front of a train or poisoning himself.  He reports he mixed 6 oz of bleach with milk and drank it in an attempt to commit suicide.  He states he vomited approximately 5 minutes later.  Patient states he has been experiencing increased stress and anxiety because of disagreements and altercations with his family members.  Patient denies abdominal pain and shortness a breath.  ED workup revealed CMP and CBC unremarkable.  Urine drug screen positive for THC.  ED provider spoke to poison control and discussed with GI on-call. PEC was ordered and patient was started on IV Protonix infusion. He was referred to Hospital Medicine.  Patient seen in the ED and reports experiencing suicidal ideation.  He denies auditory and visual hallucinations.  Patient also reports sore throat and denies hematemesis and hematochezia.  Patient will be admitted to Hospital Medicine for further evaluation and management.            Overview/Hospital Course:  No notes on file    Interval History:  Patient seen and examined.  Complains of nausea but otherwise no complaints.  Plan for EGD this afternoon.  Remains PEC'd.  Tele psych consult appreciated.    Review of Systems   Respiratory:  Negative for shortness of breath.    Cardiovascular:  Negative for chest pain.   Gastrointestinal:  Positive for nausea. Negative for abdominal  pain and vomiting.   Psychiatric/Behavioral:  Positive for suicidal ideas.      Objective:     Vital Signs (Most Recent):  Temp: 97.7 °F (36.5 °C) (08/25/23 1315)  Pulse: 85 (08/25/23 1315)  Resp: 16 (08/25/23 1315)  BP: 126/74 (08/25/23 1315)  SpO2: 98 % (08/25/23 1315) Vital Signs (24h Range):  Temp:  [97.4 °F (36.3 °C)-99.1 °F (37.3 °C)] 97.7 °F (36.5 °C)  Pulse:  [] 85  Resp:  [14-19] 16  SpO2:  [96 %-100 %] 98 %  BP: (101-130)/(53-85) 126/74     Weight: 61.6 kg (135 lb 12.9 oz)  Body mass index is 24.84 kg/m².    Intake/Output Summary (Last 24 hours) at 8/25/2023 1401  Last data filed at 8/25/2023 0621  Gross per 24 hour   Intake 1542.65 ml   Output --   Net 1542.65 ml         Physical Exam  Constitutional:       General: She is not in acute distress.     Appearance: She is well-developed.   HENT:      Head: Normocephalic and atraumatic.   Eyes:      Pupils: Pupils are equal, round, and reactive to light.   Cardiovascular:      Rate and Rhythm: Normal rate and regular rhythm.      Heart sounds: No murmur heard.  Pulmonary:      Effort: Pulmonary effort is normal. No respiratory distress.      Breath sounds: Normal breath sounds. No wheezing or rales.   Abdominal:      General: Bowel sounds are normal. There is no distension.      Palpations: Abdomen is soft.      Tenderness: There is no abdominal tenderness.   Musculoskeletal:         General: Normal range of motion.   Skin:     General: Skin is warm and dry.      Findings: No rash.   Neurological:      Mental Status: She is alert and oriented to person, place, and time.      Cranial Nerves: No cranial nerve deficit.   Psychiatric:      Comments: Flat affect             Significant Labs: All pertinent labs within the past 24 hours have been reviewed.    Significant Imaging: I have reviewed all pertinent imaging results/findings within the past 24 hours.      Assessment/Plan:      Suicidal behavior with attempted self-injury  Hx of depression with attempt  by ingesting bleach    -Protonix IV for bleach ingestion  -consult psych, appreciate recommendations  -Consult GI for bleach ingestion - EGD today        Mild intermittent asthma without complication  Noted. Asthma well controlled    -PRN duo nebs      Major depressive disorder, recurrent severe without psychotic features  Patient has persistent depression which is severe and is currently uncontrolled. Will Continue anti-depressant medications. We will consult psychiatry at this time. Patient does not display psychosis at this time. Continue to monitor closely and adjust plan of care as needed.    -PEC   -sitter at bedside    NAEEM (generalized anxiety disorder), lifelong  History noted    -Continue home medications  -Psych consult reviewed        VTE Risk Mitigation (From admission, onward)         Ordered     IP VTE LOW RISK PATIENT  Once         08/24/23 2034     Place sequential compression device  Until discontinued         08/24/23 2034                Discharge Planning   RAGINI:      Code Status: Full Code   Is the patient medically ready for discharge?:     Reason for patient still in hospital (select all that apply): Patient trending condition and Treatment                     Lissette Diane MD  Department of Hospital Medicine   [facility] - Endoscopy

## 2023-08-25 NOTE — HPI
Kierra Mccain is a 27-year-old transgender male who presented to the emergency room for psychiatric evaluation following a suicide attempt.  Patient has a history of suicidal ideations with different plans involving cutting his wrists, jumping in front of a train or poisoning himself.  He reports he mixed 6 oz of bleach with milk and drank it in an attempt to commit suicide.  He states he vomited approximately 5 minutes later.  Patient states he has been experiencing increased stress and anxiety because of disagreements and altercations with his family members.  Patient denies abdominal pain and shortness a breath.  ED workup revealed CMP and CBC unremarkable.  Urine drug screen positive for THC.  ED provider spoke to poison control and discussed with GI on-call. PEC was ordered and patient was started on IV Protonix infusion. He was referred to Hospital Medicine.  Patient seen in the ED and reports experiencing suicidal ideation.  He denies auditory and visual hallucinations.  Patient also reports sore throat and denies hematemesis and hematochezia.  Patient will be admitted to Hospital Medicine for further evaluation and management.

## 2023-08-25 NOTE — ASSESSMENT & PLAN NOTE
Patient has persistent depression which is severe and is currently uncontrolled. Will Continue anti-depressant medications. We will consult psychiatry at this time. Patient does not display psychosis at this time. Continue to monitor closely and adjust plan of care as needed.    -PEC   -sitter at bedside

## 2023-08-25 NOTE — ANESTHESIA PREPROCEDURE EVALUATION
08/25/2023  Kierra Mccain is a 27 y.o., female.      Pre-op Assessment    I have reviewed the Patient Summary Reports.     I have reviewed the Nursing Notes. I have reviewed the NPO Status.   I have reviewed the Medications.     Review of Systems  Anesthesia Hx:  No problems with previous Anesthesia    Social:  Non-Smoker    Cardiovascular:  Cardiovascular Normal     Pulmonary:   Asthma asymptomatic and mild    Renal/:  Renal/ Normal     Musculoskeletal:  Spine Disorders: lumbar    Endocrine:  Endocrine Normal    Psych:   Psychiatric History (Panic Disorder, Suicide attempt) anxiety Gender Identity Disorder  Impulse Control Disorder         Physical Exam  General: Well nourished, Cooperative, Alert and Oriented    Airway:  Mallampati: II   Mouth Opening: Normal  TM Distance: Normal  Neck ROM: Normal ROM        Anesthesia Plan  Type of Anesthesia, risks & benefits discussed:    Anesthesia Type: Gen ETT, Gen Supraglottic Airway, Gen Natural Airway, MAC  Intra-op Monitoring Plan: Standard ASA Monitors  Post Op Pain Control Plan: multimodal analgesia  Induction:  IV  Airway Plan: Direct, Video and Fiberoptic, Post-Induction  Informed Consent: Informed consent signed with the Patient and all parties understand the risks and agree with anesthesia plan.  All questions answered.   ASA Score: 3  Anesthesia Plan Notes: Drank bleach    Ready For Surgery From Anesthesia Perspective.     .

## 2023-08-25 NOTE — TRANSFER OF CARE
"Anesthesia Transfer of Care Note    Patient: Kierra Mccain    Procedure(s) Performed: Procedure(s) (LRB):  EGD (ESOPHAGOGASTRODUODENOSCOPY) (N/A)    Patient location: PACU    Anesthesia Type: general    Transport from OR: Transported from OR on 2-3 L/min O2 by NC with adequate spontaneous ventilation    Post pain: adequate analgesia    Post assessment: no apparent anesthetic complications and tolerated procedure well    Post vital signs: stable    Level of consciousness: awake, alert and oriented    Nausea/Vomiting: no nausea/vomiting    Complications: none    Transfer of care protocol was followed      Last vitals:   Visit Vitals  BP (!) 112/58   Pulse 90   Temp 36.5 °C (97.7 °F) (Skin)   Resp 16   Ht 5' 2" (1.575 m)   Wt 61.6 kg (135 lb 12.9 oz)   SpO2 97%   Breastfeeding No   BMI 24.84 kg/m²     "

## 2023-08-26 VITALS
HEIGHT: 62 IN | SYSTOLIC BLOOD PRESSURE: 130 MMHG | BODY MASS INDEX: 25.07 KG/M2 | RESPIRATION RATE: 18 BRPM | TEMPERATURE: 98 F | DIASTOLIC BLOOD PRESSURE: 77 MMHG | WEIGHT: 136.25 LBS | HEART RATE: 87 BPM | OXYGEN SATURATION: 98 %

## 2023-08-26 LAB
ALBUMIN SERPL BCP-MCNC: 3.8 G/DL (ref 3.5–5.2)
ALP SERPL-CCNC: 61 U/L (ref 55–135)
ALT SERPL W/O P-5'-P-CCNC: 15 U/L (ref 10–44)
ANION GAP SERPL CALC-SCNC: 10 MMOL/L (ref 8–16)
AST SERPL-CCNC: 16 U/L (ref 10–40)
BASOPHILS # BLD AUTO: 0.03 K/UL (ref 0–0.2)
BASOPHILS NFR BLD: 0.4 % (ref 0–1.9)
BILIRUB SERPL-MCNC: 0.7 MG/DL (ref 0.1–1)
BUN SERPL-MCNC: 7 MG/DL (ref 6–20)
CALCIUM SERPL-MCNC: 9 MG/DL (ref 8.7–10.5)
CHLORIDE SERPL-SCNC: 104 MMOL/L (ref 95–110)
CO2 SERPL-SCNC: 26 MMOL/L (ref 23–29)
CREAT SERPL-MCNC: 0.7 MG/DL (ref 0.5–1.4)
DIFFERENTIAL METHOD: NORMAL
EOSINOPHIL # BLD AUTO: 0.2 K/UL (ref 0–0.5)
EOSINOPHIL NFR BLD: 2.2 % (ref 0–8)
ERYTHROCYTE [DISTWIDTH] IN BLOOD BY AUTOMATED COUNT: 13.2 % (ref 11.5–14.5)
EST. GFR  (NO RACE VARIABLE): >60 ML/MIN/1.73 M^2
GLUCOSE SERPL-MCNC: 102 MG/DL (ref 70–110)
HCT VFR BLD AUTO: 39.5 % (ref 37–48.5)
HGB BLD-MCNC: 13 G/DL (ref 12–16)
IMM GRANULOCYTES # BLD AUTO: 0.02 K/UL (ref 0–0.04)
IMM GRANULOCYTES NFR BLD AUTO: 0.3 % (ref 0–0.5)
LYMPHOCYTES # BLD AUTO: 3 K/UL (ref 1–4.8)
LYMPHOCYTES NFR BLD: 43.2 % (ref 18–48)
MCH RBC QN AUTO: 29.6 PG (ref 27–31)
MCHC RBC AUTO-ENTMCNC: 32.9 G/DL (ref 32–36)
MCV RBC AUTO: 90 FL (ref 82–98)
MONOCYTES # BLD AUTO: 0.6 K/UL (ref 0.3–1)
MONOCYTES NFR BLD: 8.9 % (ref 4–15)
NEUTROPHILS # BLD AUTO: 3.1 K/UL (ref 1.8–7.7)
NEUTROPHILS NFR BLD: 45 % (ref 38–73)
NRBC BLD-RTO: 0 /100 WBC
PLATELET # BLD AUTO: 274 K/UL (ref 150–450)
PMV BLD AUTO: 9.4 FL (ref 9.2–12.9)
POTASSIUM SERPL-SCNC: 3.6 MMOL/L (ref 3.5–5.1)
PROT SERPL-MCNC: 6.7 G/DL (ref 6–8.4)
RBC # BLD AUTO: 4.39 M/UL (ref 4–5.4)
SODIUM SERPL-SCNC: 140 MMOL/L (ref 136–145)
WBC # BLD AUTO: 6.95 K/UL (ref 3.9–12.7)

## 2023-08-26 PROCEDURE — 63600175 PHARM REV CODE 636 W HCPCS: Performed by: INTERNAL MEDICINE

## 2023-08-26 PROCEDURE — 36415 COLL VENOUS BLD VENIPUNCTURE: CPT | Performed by: HOSPITALIST

## 2023-08-26 PROCEDURE — 25000003 PHARM REV CODE 250: Performed by: NURSE PRACTITIONER

## 2023-08-26 PROCEDURE — 94761 N-INVAS EAR/PLS OXIMETRY MLT: CPT | Mod: 59

## 2023-08-26 PROCEDURE — 96366 THER/PROPH/DIAG IV INF ADDON: CPT

## 2023-08-26 PROCEDURE — 85025 COMPLETE CBC W/AUTO DIFF WBC: CPT | Performed by: HOSPITALIST

## 2023-08-26 PROCEDURE — G0378 HOSPITAL OBSERVATION PER HR: HCPCS

## 2023-08-26 PROCEDURE — 99900035 HC TECH TIME PER 15 MIN (STAT)

## 2023-08-26 PROCEDURE — 96372 THER/PROPH/DIAG INJ SC/IM: CPT | Performed by: INTERNAL MEDICINE

## 2023-08-26 PROCEDURE — 80053 COMPREHEN METABOLIC PANEL: CPT | Performed by: HOSPITALIST

## 2023-08-26 PROCEDURE — 25000003 PHARM REV CODE 250: Performed by: HOSPITALIST

## 2023-08-26 RX ORDER — TESTOSTERONE CYPIONATE 200 MG/ML
100 INJECTION, SOLUTION INTRAMUSCULAR ONCE
Status: COMPLETED | OUTPATIENT
Start: 2023-08-26 | End: 2023-08-26

## 2023-08-26 RX ADMIN — BUSPIRONE HYDROCHLORIDE 5 MG: 5 TABLET ORAL at 08:08

## 2023-08-26 RX ADMIN — VENLAFAXINE HYDROCHLORIDE 150 MG: 150 CAPSULE, EXTENDED RELEASE ORAL at 08:08

## 2023-08-26 RX ADMIN — TESTOSTERONE CYPIONATE 100 MG: 200 INJECTION, SOLUTION INTRAMUSCULAR at 01:08

## 2023-08-26 NOTE — PLAN OF CARE
Problem: Violence Risk or Actual  Goal: Anger and Impulse Control  Outcome: Ongoing, Progressing     Problem: Adult Inpatient Plan of Care  Goal: Plan of Care Review  Outcome: Ongoing, Progressing  Goal: Patient-Specific Goal (Individualized)  Outcome: Ongoing, Progressing  Goal: Absence of Hospital-Acquired Illness or Injury  Outcome: Ongoing, Progressing   Patient continues to remain calm and cooperative, displaying pleasant behavior overall towards others. This shift, patient denies suicidal ideation during initial assessment. Patient continues to communicate to staff effectively. No self-harming behaviors evident this shift. New IV access obtained this shift. Continues to have a sitter AAT. Ambulatory to the restroom. No signs or voicing from patient of anger or anxiety this shift.

## 2023-08-26 NOTE — NURSING
Provider, Noy HANLEY, contacted at [phone] regarding slight skin irritation/redness to chest area, sites of telemetry stickers. Notified patient reports mild itching. Notified no other stickers available. Orders noted. Provider OK's to place same type of stickers at this time.

## 2023-08-26 NOTE — PLAN OF CARE
Pt is medically clear for discharge to inpatient psych facility per Dr Sultan LOZANO to place ADT22. Pt's first choice is [facility]. CM to inform transfer center of this.

## 2023-08-26 NOTE — SUBJECTIVE & OBJECTIVE
Interval History:  Patient seen and examined.  Complains of nausea but otherwise no complaints.  Plan for EGD this afternoon.  Remains PEC'd.  Tele psych consult appreciated.    Review of Systems   Respiratory:  Negative for shortness of breath.    Cardiovascular:  Negative for chest pain.   Gastrointestinal:  Positive for nausea. Negative for abdominal pain and vomiting.   Psychiatric/Behavioral:  Positive for suicidal ideas.      Objective:     Vital Signs (Most Recent):  Temp: 98.3 °F (36.8 °C) (08/26/23 0708)  Pulse: 77 (08/26/23 0708)  Resp: 18 (08/26/23 0708)  BP: 116/76 (08/26/23 0708)  SpO2: 98 % (08/26/23 0708) Vital Signs (24h Range):  Temp:  [97.4 °F (36.3 °C)-98.3 °F (36.8 °C)] 98.3 °F (36.8 °C)  Pulse:  [77-90] 77  Resp:  [16-18] 18  SpO2:  [96 %-100 %] 98 %  BP: (112-130)/(58-76) 116/76     Weight: 61.8 kg (136 lb 3.9 oz)  Body mass index is 24.92 kg/m².    Intake/Output Summary (Last 24 hours) at 8/26/2023 1000  Last data filed at 8/26/2023 0559  Gross per 24 hour   Intake 1746.47 ml   Output 550 ml   Net 1196.47 ml           Physical Exam  Constitutional:       General: He is not in acute distress.     Appearance: He is well-developed.   HENT:      Head: Normocephalic and atraumatic.   Eyes:      Pupils: Pupils are equal, round, and reactive to light.   Cardiovascular:      Rate and Rhythm: Normal rate and regular rhythm.      Heart sounds: No murmur heard.  Pulmonary:      Effort: Pulmonary effort is normal. No respiratory distress.      Breath sounds: Normal breath sounds. No wheezing or rales.   Abdominal:      General: Bowel sounds are normal. There is no distension.      Palpations: Abdomen is soft.      Tenderness: There is no abdominal tenderness.   Musculoskeletal:         General: Normal range of motion.   Skin:     General: Skin is warm and dry.      Findings: No rash.   Neurological:      Mental Status: He is alert and oriented to person, place, and time.      Cranial Nerves: No cranial  nerve deficit.   Psychiatric:      Comments: Flat affect             Significant Labs:   Lab Results   Component Value Date    WBC 6.95 08/26/2023    HGB 13.0 08/26/2023    HCT 39.5 08/26/2023    MCV 90 08/26/2023     08/26/2023       CMP  Sodium   Date Value Ref Range Status   08/26/2023 140 136 - 145 mmol/L Final   07/05/2019 138 134 - 144 mmol/L      Potassium   Date Value Ref Range Status   08/26/2023 3.6 3.5 - 5.1 mmol/L Final     Chloride   Date Value Ref Range Status   08/26/2023 104 95 - 110 mmol/L Final   07/05/2019 102 98 - 110 mmol/L      CO2   Date Value Ref Range Status   08/26/2023 26 23 - 29 mmol/L Final     Glucose   Date Value Ref Range Status   08/26/2023 102 70 - 110 mg/dL Final   07/05/2019 83 70 - 99 mg/dL      BUN   Date Value Ref Range Status   08/26/2023 7 6 - 20 mg/dL Final     Creatinine   Date Value Ref Range Status   08/26/2023 0.7 0.5 - 1.4 mg/dL Final   07/05/2019 0.78 0.60 - 1.40 mg/dL      Calcium   Date Value Ref Range Status   08/26/2023 9.0 8.7 - 10.5 mg/dL Final     Total Protein   Date Value Ref Range Status   08/26/2023 6.7 6.0 - 8.4 g/dL Final     Albumin   Date Value Ref Range Status   08/26/2023 3.8 3.5 - 5.2 g/dL Final   07/05/2019 4.7 3.1 - 4.7 g/dL      Total Bilirubin   Date Value Ref Range Status   08/26/2023 0.7 0.1 - 1.0 mg/dL Final     Comment:     For infants and newborns, interpretation of results should be based  on gestational age, weight and in agreement with clinical  observations.    Premature Infant recommended reference ranges:  Up to 24 hours.............<8.0 mg/dL  Up to 48 hours............<12.0 mg/dL  3-5 days..................<15.0 mg/dL  6-29 days.................<15.0 mg/dL       Alkaline Phosphatase   Date Value Ref Range Status   08/26/2023 61 55 - 135 U/L Final     AST   Date Value Ref Range Status   08/26/2023 16 10 - 40 U/L Final     ALT   Date Value Ref Range Status   08/26/2023 15 10 - 44 U/L Final     Anion Gap   Date Value Ref Range  Status   08/26/2023 10 8 - 16 mmol/L Final     eGFR   Date Value Ref Range Status   08/26/2023 >60 >60 mL/min/1.73 m^2 Final     Microbiology Results (last 7 days)       ** No results found for the last 168 hours. **          Significant Imaging:   EGD:   - Normal esophagus.                          - Z-line regular, 38 cm from the incisors.                          - Medium-sized hiatal hernia.                          - Normal stomach.                          - Normal examined duodenum.                          - No specimens collected.

## 2023-08-26 NOTE — DISCHARGE SUMMARY
[facility] Medicine  Discharge Summary      Patient Name: Kierra Mccain  MRN: [id number]  [facility]: [id number]  Patient Class: OP- Observation  Admission Date: 8/24/2023  Hospital Length of Stay: 0 days  Discharge Date and Time:  08/26/2023 2:48 PM  Attending Physician: Jyoti Guzmán MD   Discharging Provider: Jyoti Guzmán MD  Primary Care Provider: Lityz Barcenas MD    Primary Care Team: Networked reference to record PCT     HPI:   Kierra Mccain is a 27-year-old transgender male who presented to the emergency room for psychiatric evaluation following a suicide attempt.  Patient has a history of suicidal ideations with different plans involving cutting his wrists, jumping in front of a train or poisoning himself.  He reports he mixed 6 oz of bleach with milk and drank it in an attempt to commit suicide.  He states he vomited approximately 5 minutes later.  Patient states he has been experiencing increased stress and anxiety because of disagreements and altercations with his family members.  Patient denies abdominal pain and shortness a breath.  ED workup revealed CMP and CBC unremarkable.  Urine drug screen positive for THC.  ED provider spoke to poison control and discussed with GI on-call. PEC was ordered and patient was started on IV Protonix infusion. He was referred to Hospital Medicine.  Patient seen in the ED and reports experiencing suicidal ideation.  He denies auditory and visual hallucinations.  Patient also reports sore throat and denies hematemesis and hematochezia.  Patient will be admitted to Hospital Medicine for further evaluation and management.            Procedure(s) (LRB):  EGD (ESOPHAGOGASTRODUODENOSCOPY) (N/A)      Hospital Course:   27-year-old transgender male who was observed by hospital medicine service for psychiatric evaluation following a suicide attempt.  Attempted suicide by bleach ingestion.  Patient was maintained on pec precautions.  Patient was  evaluated by GI specialist and underwent EGD which was unremarkable.  Patient has been accepted at inpatient psychiatric facility for further management as per psychiatrist recommendations.  Patient is in agreement.       Goals of Care Treatment Preferences:  Code Status: Full Code      Consults:   Consults (From admission, onward)          Status Ordering Provider     Inpatient consult to Telemedicine - Psychiatry  Once        Provider:  Kalpesh Gastelum MD    Acknowledged RAMIN TOLBERT     Inpatient consult to Gastroenterology  Once        Provider:  Jorge Arevalo MD    Completed RAMIN TOLBERT            Pulmonary  Mild intermittent asthma without complication  Noted. Asthma well controlled    -PRN duo nebs      Other  * Suicidal behavior with attempted self-injury  Hx of depression with attempt by ingesting bleach    -consult psych, appreciate recommendations  -Appreciated GI assistance, EGD results reviewed.         Major depressive disorder, recurrent severe without psychotic features  Patient has persistent depression which is severe and is currently uncontrolled. Will Continue anti-depressant medications. We will consult psychiatry at this time. Patient does not display psychosis at this time. Continue to monitor closely and adjust plan of care as needed.    -PEC   -sitter at bedside    NAEEM (generalized anxiety disorder), lifelong  History noted    -Continue home medications  -Psych consult reviewed      EGD:   - Normal esophagus.                          - Z-line regular, 38 cm from the incisors.                          - Medium-sized hiatal hernia.                          - Normal stomach.                          - Normal examined duodenum.                          - No specimens collected.     Final Active Diagnoses:    Diagnosis Date Noted POA    PRINCIPAL PROBLEM:  Suicidal behavior with attempted self-injury [T14.91XA] 08/24/2023 Yes    Mild intermittent asthma without complication  [J45.20] 08/02/2019 Yes    Major depressive disorder, recurrent severe without psychotic features [F33.2] 08/01/2019 Yes    NAEEM (generalized anxiety disorder), lifelong [F41.1] 04/29/2016 Yes      Problems Resolved During this Admission:       Discharged Condition: good    Disposition: Psychiatric Hospital    Follow Up:   Follow-up Information       Litzy Barcenas MD Follow up.    Specialty: Internal Medicine  Why: As needed  Contact information:  [address]  [address]  [place] [address]  [phone]                           Patient Instructions:      Diet Adult Regular     Notify your health care provider if you experience any of the following:  increased confusion or weakness     Notify your health care provider if you experience any of the following:  persistent dizziness, light-headedness, or visual disturbances     Notify your health care provider if you experience any of the following:   Order Comments: Any worsening in symptoms, suicidal ideation     Activity as tolerated   Order Comments: Fall precaution       Significant Diagnostic Studies: Labs:   CMP   Recent Labs   Lab 08/24/23  1614 08/25/23  0348 08/26/23  0514    138 140   K 3.6 3.7 3.6    107 104   CO2 20* 18* 26   GLU 82 54* 102   BUN 10 8 7   CREATININE 0.8 0.7 0.7   CALCIUM 9.7 8.8 9.0   PROT 7.7 6.4 6.7   ALBUMIN 4.5 3.7 3.8   BILITOT 0.8 1.1* 0.7   ALKPHOS 54* 48* 61   AST 17 15 16   ALT 17 14 15   ANIONGAP 15 13 10    and CBC   Recent Labs   Lab 08/24/23  1614 08/25/23  0348 08/26/23  0514   WBC 8.15 5.91 6.95   HGB 13.7 11.4* 13.0   HCT 39.3 35.7* 39.5    235 274       Pending Diagnostic Studies:       None           Medications:  Reconciled Home Medications:      Medication List        CONTINUE taking these medications      busPIRone 10 MG tablet  Commonly known as: BUSPAR  Take 5 mg by mouth 2 (two) times daily.     ondansetron 4 MG Tbdl  Commonly known as: ZOFRAN-ODT  Take 2 tablets (8 mg total) by  mouth every 8 (eight) hours as needed (nausea, vomiting).     oxyCODONE-acetaminophen  mg per tablet  Commonly known as: PERCOCET  Take 1 tablet by mouth every 4 (four) hours as needed for Pain.     PROAIR HFA 90 mcg/actuation inhaler  Generic drug: albuterol  Inhale 2 puffs into the lungs every 6 (six) hours as needed for Wheezing. Rescue     propranoloL 20 MG tablet  Commonly known as: INDERAL  Take 20 mg by mouth as needed. anxiety     testosterone cypionate 100 mg/mL injection  Commonly known as: DEPOTESTOTERONE CYPIONATE  Inject 100 mg into the muscle once a week.     traZODone 50 MG tablet  Commonly known as: DESYREL  Take 25-50 mg by mouth nightly as needed for Insomnia.     TYLENOL ORAL  Take 1 tablet by mouth as needed (pain).     venlafaxine 150 MG Cp24  Commonly known as: EFFEXOR-XR  Take 150 mg by mouth 2 (two) times daily.     VITAMIN B-12 ORAL  Take 1 tablet by mouth once daily.              Indwelling Lines/Drains at time of discharge:   Lines/Drains/Airways       None                   Time spent on the discharge of patient: 33 minutes         Jyoti Guzmán MD  Department of Hospital Medicine  [facility]/Surg

## 2023-08-26 NOTE — PROGRESS NOTES
Discharge instructions given to Cecil at [facility]. IV discontinued with catheter intact, pressure applied to site and secured with tape. Patient tolerated well.   Patient transported via [facility] with .

## 2023-08-26 NOTE — ASSESSMENT & PLAN NOTE
Hx of depression with attempt by ingesting bleach    -consult psych, appreciate recommendations  -Appreciated GI assistance, EGD results reviewed.

## 2023-08-26 NOTE — ANESTHESIA POSTPROCEDURE EVALUATION
Anesthesia Post Evaluation    Patient: Kierra Mccain    Procedure(s) Performed: Procedure(s) (LRB):  EGD (ESOPHAGOGASTRODUODENOSCOPY) (N/A)    Final Anesthesia Type: general      Patient location during evaluation: PACU  Patient participation: Yes- Able to Participate  Level of consciousness: awake and alert and oriented  Post-procedure vital signs: reviewed and stable  Pain management: adequate  Airway patency: patent    PONV status at discharge: No PONV  Anesthetic complications: no      Cardiovascular status: blood pressure returned to baseline and stable  Respiratory status: unassisted and spontaneous ventilation  Hydration status: euvolemic  Follow-up not needed.          Vitals Value Taken Time   /77 08/26/23 1127   Temp 36.6 °C (97.8 °F) 08/26/23 1127   Pulse 87 08/26/23 1127   Resp 18 08/26/23 1127   SpO2 98 % 08/26/23 1127         Event Time   Out of Recovery 08/25/2023 14:56:09         Pain/Ilda Score: Ilda Score: 10 (8/25/2023  2:35 PM)

## 2023-08-26 NOTE — CARE UPDATE
08/26/23 0843   Patient Assessment/Suction   Level of Consciousness (AVPU) alert   Respiratory Effort Normal;Unlabored   Expansion/Accessory Muscles/Retractions no use of accessory muscles;no retractions;expansion symmetric   Rhythm/Pattern, Respiratory unlabored;pattern regular;depth regular;no shortness of breath reported   PRE-TX-O2   Device (Oxygen Therapy) room air   SpO2 99 %   Pulse Oximetry Type Intermittent   $ Pulse Oximetry - Multiple Charge Pulse Oximetry - Multiple   Pulse 80   Resp 18   Aerosol Therapy   $ Aerosol Therapy Charges PRN treatment not required   Respiratory Treatment Status (SVN) PRN treatment not required

## 2023-08-26 NOTE — PLAN OF CARE
Pt is clear for discharge from . Discharging to [facility]       08/26/23 1507   Final Note   Assessment Type Final Discharge Note   Anticipated Discharge Disposition Psych

## 2023-10-20 NOTE — NURSING
"2055: Spoke with "Sahara" from poison control. Update provided regarding current pt. Status.   " Methotrexate Pregnancy And Lactation Text: This medication is Pregnancy Category X and is known to cause fetal harm. This medication is excreted in breast milk.

## (undated) DEVICE — SUT 3-0 ETHILON 18 FS-1

## (undated) DEVICE — POSITIONER HEEL FOAM CONVOLTD

## (undated) DEVICE — EVACUATOR WOUND BULB 100CC

## (undated) DEVICE — PENCIL ELECTROSURG HOLST W/BLD

## (undated) DEVICE — PAD ABD 8X10 STERILE

## (undated) DEVICE — Device

## (undated) DEVICE — ELECTRODE BLD EXT INSUL 1

## (undated) DEVICE — SOL NS 1000CC

## (undated) DEVICE — SUT VICRYL 2-0 CT-1 18 CR

## (undated) DEVICE — NDL HYPO REG 25G X 1 1/2

## (undated) DEVICE — SEE MEDLINE ITEM 157131

## (undated) DEVICE — SYS CLSR DERMABOND PRINEO 22CM

## (undated) DEVICE — SEE MEDLINE ITEM 152742

## (undated) DEVICE — ELECTRODE REM PLYHSV RETURN 9

## (undated) DEVICE — STAPLER SKIN ROTATING HEAD

## (undated) DEVICE — DRESSING PICO 4X12 ACT SZ 2X10

## (undated) DEVICE — SUT STRATAFIX PGAPCL 3 FS-1

## (undated) DEVICE — SKIN MARKER DEVON 160

## (undated) DEVICE — SUT VICRYL PLUS 3-0 SH 18IN

## (undated) DEVICE — POSITIONER HEAD DONUT 9IN FOAM

## (undated) DEVICE — SEE MEDLINE ITEM 153151

## (undated) DEVICE — SYR 10CC LUER LOCK

## (undated) DEVICE — GLOVE BIOGEL SKINSENSE PI 7.5

## (undated) DEVICE — SPONGE SUPER KERLIX 6X6.75IN

## (undated) DEVICE — SUT 5/0 18IN PLAIN FAST AB

## (undated) DEVICE — BLANKET HYPO/HYPERTHERMIA INF

## (undated) DEVICE — MARKER SURGICAL W/15CM RULER

## (undated) DEVICE — SPONGE LAP 18X18 PREWASHED

## (undated) DEVICE — SEE MEDLINE ITEM 157110

## (undated) DEVICE — SUT VICRYL PLUS 2-0 CT1 18

## (undated) DEVICE — SEE MEDLINE ITEM 152622

## (undated) DEVICE — DRAPE STERI INSTRUMENT 1018

## (undated) DEVICE — DRAIN CHANNEL ROUND 15FR